# Patient Record
Sex: FEMALE | Race: WHITE | NOT HISPANIC OR LATINO | Employment: FULL TIME | URBAN - METROPOLITAN AREA
[De-identification: names, ages, dates, MRNs, and addresses within clinical notes are randomized per-mention and may not be internally consistent; named-entity substitution may affect disease eponyms.]

---

## 2018-04-07 ENCOUNTER — HOSPITAL ENCOUNTER (OUTPATIENT)
Facility: MEDICAL CENTER | Age: 33
End: 2018-04-07
Attending: OBSTETRICS & GYNECOLOGY | Admitting: OBSTETRICS & GYNECOLOGY
Payer: COMMERCIAL

## 2018-04-08 ENCOUNTER — APPOINTMENT (OUTPATIENT)
Dept: OTHER | Facility: IMAGING CENTER | Age: 33
End: 2018-04-08

## 2018-05-05 ENCOUNTER — APPOINTMENT (OUTPATIENT)
Dept: OTHER | Facility: IMAGING CENTER | Age: 33
End: 2018-05-05

## 2018-06-03 ENCOUNTER — HOSPITAL ENCOUNTER (OUTPATIENT)
Facility: MEDICAL CENTER | Age: 33
End: 2018-06-03
Attending: OBSTETRICS & GYNECOLOGY | Admitting: OBSTETRICS & GYNECOLOGY
Payer: COMMERCIAL

## 2018-06-04 ENCOUNTER — HOSPITAL ENCOUNTER (INPATIENT)
Facility: MEDICAL CENTER | Age: 33
LOS: 2 days | End: 2018-06-06
Attending: OBSTETRICS & GYNECOLOGY | Admitting: OBSTETRICS & GYNECOLOGY
Payer: COMMERCIAL

## 2018-06-04 VITALS
SYSTOLIC BLOOD PRESSURE: 137 MMHG | BODY MASS INDEX: 26.66 KG/M2 | HEART RATE: 75 BPM | WEIGHT: 160 LBS | RESPIRATION RATE: 18 BRPM | TEMPERATURE: 97 F | DIASTOLIC BLOOD PRESSURE: 85 MMHG | HEIGHT: 65 IN

## 2018-06-04 LAB
BASOPHILS # BLD AUTO: 0.2 % (ref 0–1.8)
BASOPHILS # BLD: 0.03 K/UL (ref 0–0.12)
EOSINOPHIL # BLD AUTO: 0 K/UL (ref 0–0.51)
EOSINOPHIL NFR BLD: 0 % (ref 0–6.9)
ERYTHROCYTE [DISTWIDTH] IN BLOOD BY AUTOMATED COUNT: 44 FL (ref 35.9–50)
ERYTHROCYTE [DISTWIDTH] IN BLOOD BY AUTOMATED COUNT: 44.3 FL (ref 35.9–50)
HCT VFR BLD AUTO: 30.1 % (ref 37–47)
HCT VFR BLD AUTO: 38.4 % (ref 37–47)
HGB BLD-MCNC: 10.6 G/DL (ref 12–16)
HGB BLD-MCNC: 13.6 G/DL (ref 12–16)
HOLDING TUBE BB 8507: NORMAL
IMM GRANULOCYTES # BLD AUTO: 0.08 K/UL (ref 0–0.11)
IMM GRANULOCYTES NFR BLD AUTO: 0.5 % (ref 0–0.9)
LYMPHOCYTES # BLD AUTO: 1.12 K/UL (ref 1–4.8)
LYMPHOCYTES NFR BLD: 6.7 % (ref 22–41)
MCH RBC QN AUTO: 33.5 PG (ref 27–33)
MCH RBC QN AUTO: 33.7 PG (ref 27–33)
MCHC RBC AUTO-ENTMCNC: 35.2 G/DL (ref 33.6–35)
MCHC RBC AUTO-ENTMCNC: 35.4 G/DL (ref 33.6–35)
MCV RBC AUTO: 95 FL (ref 81.4–97.8)
MCV RBC AUTO: 95.3 FL (ref 81.4–97.8)
MONOCYTES # BLD AUTO: 0.65 K/UL (ref 0–0.85)
MONOCYTES NFR BLD AUTO: 3.9 % (ref 0–13.4)
NEUTROPHILS # BLD AUTO: 14.88 K/UL (ref 2–7.15)
NEUTROPHILS NFR BLD: 88.7 % (ref 44–72)
NRBC # BLD AUTO: 0 K/UL
NRBC BLD-RTO: 0 /100 WBC
PLATELET # BLD AUTO: 162 K/UL (ref 164–446)
PLATELET # BLD AUTO: 219 K/UL (ref 164–446)
PMV BLD AUTO: 11.4 FL (ref 9–12.9)
PMV BLD AUTO: 11.6 FL (ref 9–12.9)
RBC # BLD AUTO: 3.16 M/UL (ref 4.2–5.4)
RBC # BLD AUTO: 4.04 M/UL (ref 4.2–5.4)
WBC # BLD AUTO: 16.8 K/UL (ref 4.8–10.8)
WBC # BLD AUTO: 16.9 K/UL (ref 4.8–10.8)

## 2018-06-04 PROCEDURE — 770002 HCHG ROOM/CARE - OB PRIVATE (112)

## 2018-06-04 PROCEDURE — 59025 FETAL NON-STRESS TEST: CPT | Performed by: OBSTETRICS & GYNECOLOGY

## 2018-06-04 PROCEDURE — 59409 OBSTETRICAL CARE: CPT

## 2018-06-04 PROCEDURE — 700102 HCHG RX REV CODE 250 W/ 637 OVERRIDE(OP): Performed by: OBSTETRICS & GYNECOLOGY

## 2018-06-04 PROCEDURE — 700112 HCHG RX REV CODE 229: Performed by: OBSTETRICS & GYNECOLOGY

## 2018-06-04 PROCEDURE — 0KQM0ZZ REPAIR PERINEUM MUSCLE, OPEN APPROACH: ICD-10-PCS | Performed by: OBSTETRICS & GYNECOLOGY

## 2018-06-04 PROCEDURE — 700101 HCHG RX REV CODE 250

## 2018-06-04 PROCEDURE — 700105 HCHG RX REV CODE 258: Performed by: OBSTETRICS & GYNECOLOGY

## 2018-06-04 PROCEDURE — 85027 COMPLETE CBC AUTOMATED: CPT

## 2018-06-04 PROCEDURE — 36415 COLL VENOUS BLD VENIPUNCTURE: CPT

## 2018-06-04 PROCEDURE — 700111 HCHG RX REV CODE 636 W/ 250 OVERRIDE (IP): Performed by: OBSTETRICS & GYNECOLOGY

## 2018-06-04 PROCEDURE — A9270 NON-COVERED ITEM OR SERVICE: HCPCS | Performed by: OBSTETRICS & GYNECOLOGY

## 2018-06-04 PROCEDURE — 700111 HCHG RX REV CODE 636 W/ 250 OVERRIDE (IP)

## 2018-06-04 PROCEDURE — 304965 HCHG RECOVERY SERVICES

## 2018-06-04 PROCEDURE — 85025 COMPLETE CBC W/AUTO DIFF WBC: CPT

## 2018-06-04 PROCEDURE — 303615 HCHG EPIDURAL/SPINAL ANESTHESIA FOR LABOR

## 2018-06-04 RX ORDER — IBUPROFEN 600 MG/1
600 TABLET ORAL EVERY 6 HOURS PRN
Status: DISCONTINUED | OUTPATIENT
Start: 2018-06-04 | End: 2018-06-06 | Stop reason: HOSPADM

## 2018-06-04 RX ORDER — OXYCODONE HYDROCHLORIDE AND ACETAMINOPHEN 5; 325 MG/1; MG/1
1 TABLET ORAL EVERY 4 HOURS PRN
Status: DISCONTINUED | OUTPATIENT
Start: 2018-06-04 | End: 2018-06-06 | Stop reason: HOSPADM

## 2018-06-04 RX ORDER — VITAMIN A ACETATE, BETA CAROTENE, ASCORBIC ACID, CHOLECALCIFEROL, .ALPHA.-TOCOPHEROL ACETATE, DL-, THIAMINE MONONITRATE, RIBOFLAVIN, NIACINAMIDE, PYRIDOXINE HYDROCHLORIDE, FOLIC ACID, CYANOCOBALAMIN, CALCIUM CARBONATE, FERROUS FUMARATE, ZINC OXIDE, CUPRIC OXIDE 3080; 12; 120; 400; 1; 1.84; 3; 20; 22; 920; 25; 200; 27; 10; 2 [IU]/1; UG/1; MG/1; [IU]/1; MG/1; MG/1; MG/1; MG/1; MG/1; [IU]/1; MG/1; MG/1; MG/1; MG/1; MG/1
1 TABLET, FILM COATED ORAL EVERY MORNING
Status: DISCONTINUED | OUTPATIENT
Start: 2018-06-04 | End: 2018-06-06 | Stop reason: HOSPADM

## 2018-06-04 RX ORDER — ROPIVACAINE HYDROCHLORIDE 2 MG/ML
INJECTION, SOLUTION EPIDURAL; INFILTRATION; PERINEURAL
Status: COMPLETED
Start: 2018-06-04 | End: 2018-06-04

## 2018-06-04 RX ORDER — OXYCODONE HYDROCHLORIDE AND ACETAMINOPHEN 5; 325 MG/1; MG/1
1 TABLET ORAL EVERY 4 HOURS PRN
Status: CANCELLED | OUTPATIENT
Start: 2018-06-04

## 2018-06-04 RX ORDER — IBUPROFEN 600 MG/1
600 TABLET ORAL EVERY 6 HOURS PRN
Status: CANCELLED | OUTPATIENT
Start: 2018-06-04

## 2018-06-04 RX ORDER — ACETAMINOPHEN 325 MG/1
325 TABLET ORAL EVERY 4 HOURS PRN
Status: DISCONTINUED | OUTPATIENT
Start: 2018-06-04 | End: 2018-06-06 | Stop reason: HOSPADM

## 2018-06-04 RX ORDER — DEXTROSE, SODIUM CHLORIDE, SODIUM LACTATE, POTASSIUM CHLORIDE, AND CALCIUM CHLORIDE 5; .6; .31; .03; .02 G/100ML; G/100ML; G/100ML; G/100ML; G/100ML
INJECTION, SOLUTION INTRAVENOUS CONTINUOUS
Status: DISCONTINUED | OUTPATIENT
Start: 2018-06-04 | End: 2018-06-04 | Stop reason: HOSPADM

## 2018-06-04 RX ORDER — BUPIVACAINE HYDROCHLORIDE 2.5 MG/ML
INJECTION, SOLUTION EPIDURAL; INFILTRATION; INTRACAUDAL
Status: ACTIVE
Start: 2018-06-04 | End: 2018-06-04

## 2018-06-04 RX ORDER — ALUMINA, MAGNESIA, AND SIMETHICONE 2400; 2400; 240 MG/30ML; MG/30ML; MG/30ML
30 SUSPENSION ORAL EVERY 6 HOURS PRN
Status: DISCONTINUED | OUTPATIENT
Start: 2018-06-04 | End: 2018-06-04 | Stop reason: HOSPADM

## 2018-06-04 RX ORDER — SODIUM CHLORIDE, SODIUM LACTATE, POTASSIUM CHLORIDE, CALCIUM CHLORIDE 600; 310; 30; 20 MG/100ML; MG/100ML; MG/100ML; MG/100ML
INJECTION, SOLUTION INTRAVENOUS PRN
Status: DISCONTINUED | OUTPATIENT
Start: 2018-06-04 | End: 2018-06-06 | Stop reason: HOSPADM

## 2018-06-04 RX ORDER — MISOPROSTOL 200 UG/1
600 TABLET ORAL
Status: DISCONTINUED | OUTPATIENT
Start: 2018-06-04 | End: 2018-06-06 | Stop reason: HOSPADM

## 2018-06-04 RX ORDER — MISOPROSTOL 200 UG/1
800 TABLET ORAL
Status: DISCONTINUED | OUTPATIENT
Start: 2018-06-04 | End: 2018-06-04 | Stop reason: HOSPADM

## 2018-06-04 RX ORDER — AMPICILLIN 2 G/1
2 INJECTION, POWDER, FOR SOLUTION INTRAVENOUS ONCE
Status: COMPLETED | OUTPATIENT
Start: 2018-06-04 | End: 2018-06-04

## 2018-06-04 RX ORDER — AMPICILLIN 1 G/1
1 INJECTION, POWDER, FOR SOLUTION INTRAMUSCULAR; INTRAVENOUS EVERY 4 HOURS
Status: DISCONTINUED | OUTPATIENT
Start: 2018-06-04 | End: 2018-06-04

## 2018-06-04 RX ORDER — ONDANSETRON 4 MG/1
4 TABLET, ORALLY DISINTEGRATING ORAL EVERY 6 HOURS PRN
Status: DISCONTINUED | OUTPATIENT
Start: 2018-06-04 | End: 2018-06-06 | Stop reason: HOSPADM

## 2018-06-04 RX ORDER — DOCUSATE SODIUM 100 MG/1
100 CAPSULE, LIQUID FILLED ORAL 2 TIMES DAILY PRN
Status: DISCONTINUED | OUTPATIENT
Start: 2018-06-04 | End: 2018-06-06 | Stop reason: HOSPADM

## 2018-06-04 RX ORDER — OXYCODONE HYDROCHLORIDE AND ACETAMINOPHEN 5; 325 MG/1; MG/1
2 TABLET ORAL EVERY 4 HOURS PRN
Status: DISCONTINUED | OUTPATIENT
Start: 2018-06-04 | End: 2018-06-06 | Stop reason: HOSPADM

## 2018-06-04 RX ORDER — ONDANSETRON 2 MG/ML
4 INJECTION INTRAMUSCULAR; INTRAVENOUS EVERY 6 HOURS PRN
Status: DISCONTINUED | OUTPATIENT
Start: 2018-06-04 | End: 2018-06-06 | Stop reason: HOSPADM

## 2018-06-04 RX ORDER — SODIUM CHLORIDE, SODIUM LACTATE, POTASSIUM CHLORIDE, CALCIUM CHLORIDE 600; 310; 30; 20 MG/100ML; MG/100ML; MG/100ML; MG/100ML
INJECTION, SOLUTION INTRAVENOUS CONTINUOUS
Status: DISPENSED | OUTPATIENT
Start: 2018-06-04 | End: 2018-06-04

## 2018-06-04 RX ADMIN — DOCUSATE SODIUM 100 MG: 100 CAPSULE ORAL at 21:00

## 2018-06-04 RX ADMIN — SODIUM CHLORIDE, POTASSIUM CHLORIDE, SODIUM LACTATE AND CALCIUM CHLORIDE: 600; 310; 30; 20 INJECTION, SOLUTION INTRAVENOUS at 05:55

## 2018-06-04 RX ADMIN — AMPICILLIN SODIUM 1 G: 1 INJECTION, POWDER, FOR SOLUTION INTRAMUSCULAR; INTRAVENOUS at 10:14

## 2018-06-04 RX ADMIN — OXYTOCIN 125 ML/HR: 10 INJECTION, SOLUTION INTRAMUSCULAR; INTRAVENOUS at 15:01

## 2018-06-04 RX ADMIN — AMPICILLIN SODIUM 2 G: 2 INJECTION, POWDER, FOR SOLUTION INTRAMUSCULAR; INTRAVENOUS at 05:30

## 2018-06-04 RX ADMIN — ROPIVACAINE HYDROCHLORIDE 100 ML: 2 INJECTION, SOLUTION EPIDURAL; INFILTRATION at 05:49

## 2018-06-04 RX ADMIN — OXYCODONE HYDROCHLORIDE AND ACETAMINOPHEN 1 TABLET: 5; 325 TABLET ORAL at 21:00

## 2018-06-04 RX ADMIN — IBUPROFEN 600 MG: 600 TABLET, FILM COATED ORAL at 17:06

## 2018-06-04 RX ADMIN — SODIUM CHLORIDE, POTASSIUM CHLORIDE, SODIUM LACTATE AND CALCIUM CHLORIDE: 600; 310; 30; 20 INJECTION, SOLUTION INTRAVENOUS at 05:05

## 2018-06-04 ASSESSMENT — COPD QUESTIONNAIRES
IN THE PAST 12 MONTHS DO YOU DO LESS THAN YOU USED TO BECAUSE OF YOUR BREATHING PROBLEMS: DISAGREE/UNSURE
COPD SCREENING SCORE: 0
HAVE YOU SMOKED AT LEAST 100 CIGARETTES IN YOUR ENTIRE LIFE: NO/DON'T KNOW
DO YOU EVER COUGH UP ANY MUCUS OR PHLEGM?: NO/ONLY WITH OCCASIONAL COLDS OR INFECTIONS
DURING THE PAST 4 WEEKS HOW MUCH DID YOU FEEL SHORT OF BREATH: NONE/LITTLE OF THE TIME

## 2018-06-04 ASSESSMENT — PATIENT HEALTH QUESTIONNAIRE - PHQ9
1. LITTLE INTEREST OR PLEASURE IN DOING THINGS: NOT AT ALL
SUM OF ALL RESPONSES TO PHQ9 QUESTIONS 1 AND 2: 0
SUM OF ALL RESPONSES TO PHQ9 QUESTIONS 1 AND 2: 0
2. FEELING DOWN, DEPRESSED, IRRITABLE, OR HOPELESS: NOT AT ALL
2. FEELING DOWN, DEPRESSED, IRRITABLE, OR HOPELESS: NOT AT ALL
1. LITTLE INTEREST OR PLEASURE IN DOING THINGS: NOT AT ALL

## 2018-06-04 ASSESSMENT — PAIN SCALES - GENERAL
PAINLEVEL_OUTOF10: 6
PAINLEVEL_OUTOF10: 6

## 2018-06-04 ASSESSMENT — LIFESTYLE VARIABLES
EVER_SMOKED: NEVER
ALCOHOL_USE: NO

## 2018-06-04 NOTE — PROGRESS NOTES
0700: Assumed care of pt. AAO, pt has an epidural in place, denies pain. POC discussed, pt verbalized understanding. All needs met at this time.

## 2018-06-04 NOTE — PROGRESS NOTES
Dr. Ye to bedside for evaluation, SROM of meconium stained fluid at 0800. SVE 6/100/-1. Pt educated regarding meconium and what to expect at delivery

## 2018-06-04 NOTE — PROGRESS NOTES
EDC - 18 EGA - 40.2    0442 - Pt arrived to labor and delivery for Contractions. Pt placed in room 213. External monitors in place X2.  VSS. Pt reports good FM. No complaints of ROM or vaginal bleeding. Pt was sent home earlier in the night at 1/100/-2  0445 SVE 4/100/-2. FOB at bedside. POC discussed with pt and family members, all questions answered.  0450 Dr. Lezama notified at this time, orders received for admission at this time.  0505 IV started with 2 attempts, Labs drawn. Admission procedures completed at this time, pt requesting epidural, bolus started.  0540 - Dr. Vilchis at bedside. Time out called at 0540. Epidural placed at this time by Dr Vilchis. Test dose at 0547 - No reaction detected - VSS. Dermatome level of T8. Epidural infusion settings are : 10mL/hr continuous infusion, 5mL bolus every 15 minutes with a 25mL/hr dose limit.   0600 Dr. Lezama at bedside. POC discussed.  0615 - Reyes placed and draining to gravity. SVE 5-6/100/-2. Turned to right side. Category 1 FHT tracing at this time. No complaints at this time.   0700 Report given to OSCAR Ya RN.

## 2018-06-04 NOTE — PROGRESS NOTES
Pt is a ; MIKE of 6/; making her 40w1d. Pt here c/o painful UCs since 1600 this afternoon. Pt denies LOF< VB and reports +FM. EFM and TOCO applied. VSS (See flowsheet). SVE at 1/100/-2.     SVE one hour later with no cervical change noted. Dr Lezama updated on pt status. Orders received to discharge pt home with labor precautions.     General discharge instructions and term labor precautions discussed with pt and FOB. All questions answered at this time. Pt signed discharge instructions and ambulated out in stable condition.

## 2018-06-04 NOTE — CARE PLAN
Problem: Infection  Goal: Will remain free from infection    Intervention: Assess signs and symptoms of infection  Pt is free from s/s of infection  Intervention: Implement standard precautions and perform hand washing before and after patient contact  Hand hygiene performed before and after pt contact      Problem: Pain  Goal: Alleviation of Pain or a reduction in pain to the patient's comfort goal  Outcome: PROGRESSING AS EXPECTED  Pt is free from pain with an epidural in place  Intervention: Pain Management - Epidural/Spinal  Pt is comfortable with an epidural  Intervention: Pain Management--Medications  Pt educated on medications available for pain

## 2018-06-04 NOTE — H&P
IDENTIFICATION:  This is a 32-year-old  1, para 0, EDC of 2018,   which makes her 40 weeks and 2 days, who is being admitted in active labor.    HISTORY OF PRESENT ILLNESS:  Patient has been followed by Dr. Ye.  This is   an IVF pregnancy.  Mom has a history of PCOS.  She has been on metformin   throughout her pregnancy.  She also saw Dr. Meza and had a normal first   trimester screen with a negative AFP and a normal ultrasound.  Her GBS was   positive.  She presented earlier this evening with 1 cm and sent home.  She   returned, she is now 4, 100% effaced and therefore is being admitted.  She is   requesting an epidural at this time.    PAST MEDICAL HISTORY:  She has PCOS.    PAST SURGICAL HISTORY:  None.    ALLERGIES:  She has no known drug allergies.    SOCIAL HISTORY:  She is .  She is here with her .  She denies   use of tobacco, alcohol or drugs.    PHYSICAL EXAMINATION:  VITAL SIGNS:  Blood pressure 127/80, pulse is 91.  GENERAL:  She is pleasant, cooperative and appears her stated age.  HEART:  Regular rate and rhythm.  LUNGS:  Clear to auscultation bilaterally.  ABDOMEN:  Soft, gravid, Leopold's approximately 8 pounds.  GENITOURINARY:  Sterile vaginal exam, 4, 100%, -2 station and still intact.    Fetal heart tracing is category 1.  Contractions are every 3-4 minutes.    ASSESSMENT:  A 32-year-old  1, para 0 female at 40 weeks and 2 days,   here in labor, with Group B streptococcus positive status and history of   polycystic ovary syndrome.    PLAN:  To admit, obtain her epidural, start GBS prophylaxis, and we will   follow her in labor.       ____________________________________     MD JESSICA Paez / RODRÍGUEZ    DD:  2018 05:53:11  DT:  2018 06:03:22    D#:  [id number]  Job#:  [id number]

## 2018-06-04 NOTE — DELIVERY NOTE
DATE OF SERVICE:  2018    This is a 32-year-old  1, para 0 with an EGA of 40 and 2/7, who   presents complaining of uterine contractions.  Her cervix was changed to 4 cm,   subsequently admitted and received an epidural for pain control.  She   spontaneously ruptured membranes with moderate meconium after receiving a dose   of IV antibiotics for beta strep-positive status.  She received a second dose   of IV antibiotics, progressed over normal labor curve to complete with an   overall reassuring fetal heart tracing.  At complete, she was able to push   baby down to a +3 station to deliver the head atraumatically.  There was no   nuchal cord.  The shoulders and body followed without complication.  The cord   was clamped and cut.  Cord gases were collected and sent.  The infant was   handed off to awaiting respiratory team for DeLee.  At this point, the   placenta was delivered intact 3-vessel cord.  The uterus firmed up nicely   after 20 units of Pitocin.  Cervix was intact.  There was a small   second-degree midline laceration which was repaired with 3-0 Monocryl running   locking above the hymenal ring, running below the hymenal ring, was   subcuticular stitch back up to the hymenal ring.  Estimated blood loss was 300   mL.  The patient and baby to recovery room in stable condition.    FINDINGS:  Include a female infant with Apgars of 8 and 9.  There were no   complications.       ____________________________________     MD FAIZAN Sanchez / RODRÍGUEZ    DD:  2018 13:36:30  DT:  2018 13:59:01    D#:  [id number]  Job#:  [id number]

## 2018-06-04 NOTE — CARE PLAN
Problem: Risk for Infection, Impaired Wound Healing  Goal: Remain free from signs and symptoms of infection  Outcome: PROGRESSING AS EXPECTED  No s/s of infection noted at this time.    Problem: Risk for injury  Goal: Patient and fetus will be free of preventable injury/complications    Intervention: Monitor Fetal well being  FHTs being monitored continuously

## 2018-06-04 NOTE — PROGRESS NOTES
Hospital Day : 0    S: doing well; comfortable with epi    O:  Vitals:    06/04/18 0643 06/04/18 0656 06/04/18 0702 06/04/18 0730   BP: 108/70 107/64 119/67 108/66   Pulse: 97 (!) 105 100 (!) 115   Resp:       Temp:  36.4 °C (97.6 °F)     TempSrc:  Temporal     SpO2:       Weight:       Height:           Recent Labs      06/04/18   0505   WBC  16.8*   RBC  4.04*   HEMOGLOBIN  13.6   HEMATOCRIT  38.4   MCV  95.0   MCH  33.7*   MCHC  35.4*   RDW  44.3   PLATELETCT  219   MPV  11.6             Cat one; reg uc; srom lite mec; sp abx x1    A: iup at term with gbs pos and srom lite mec with active labor    P: cpm and abx and dw mec

## 2018-06-04 NOTE — PROGRESS NOTES
Pt ambulated, unable to void, pt and baby transferred to postpartum via wheelchair. Report given to Bess ESQUEDA, pt in stable condition with a firm fundus and light lochia.

## 2018-06-04 NOTE — RESPIRATORY CARE
Attendance at Delivery    Reason for attendance Meconium   Oxygen Needed 40% blowby for 2-3 minutes   Positive Pressure Needed no   Baby Vigorous yes   Evidence of Meconium Moderate amount of mec  Apgar's 8-9 Baby pink and crying before leaving the room.

## 2018-06-05 PROCEDURE — 700102 HCHG RX REV CODE 250 W/ 637 OVERRIDE(OP): Performed by: OBSTETRICS & GYNECOLOGY

## 2018-06-05 PROCEDURE — 770002 HCHG ROOM/CARE - OB PRIVATE (112)

## 2018-06-05 PROCEDURE — A9270 NON-COVERED ITEM OR SERVICE: HCPCS | Performed by: OBSTETRICS & GYNECOLOGY

## 2018-06-05 PROCEDURE — 700112 HCHG RX REV CODE 229: Performed by: OBSTETRICS & GYNECOLOGY

## 2018-06-05 RX ADMIN — IBUPROFEN 600 MG: 600 TABLET, FILM COATED ORAL at 04:39

## 2018-06-05 RX ADMIN — Medication 1 TABLET: at 08:44

## 2018-06-05 RX ADMIN — IBUPROFEN 600 MG: 600 TABLET, FILM COATED ORAL at 11:35

## 2018-06-05 RX ADMIN — DOCUSATE SODIUM 100 MG: 100 CAPSULE ORAL at 08:44

## 2018-06-05 RX ADMIN — DOCUSATE SODIUM 100 MG: 100 CAPSULE ORAL at 23:04

## 2018-06-05 RX ADMIN — IBUPROFEN 600 MG: 600 TABLET, FILM COATED ORAL at 23:03

## 2018-06-05 ASSESSMENT — PAIN SCALES - GENERAL
PAINLEVEL_OUTOF10: 5
PAINLEVEL_OUTOF10: 5
PAINLEVEL_OUTOF10: 3
PAINLEVEL_OUTOF10: 0
PAINLEVEL_OUTOF10: 6
PAINLEVEL_OUTOF10: 5
PAINLEVEL_OUTOF10: 3
PAINLEVEL_OUTOF10: 4
PAINLEVEL_OUTOF10: 6

## 2018-06-05 NOTE — PROGRESS NOTES
Hospital Day : 1    S: doing well; billy diet; min bleed    O:  Vitals:    18 1715 18 2000 18 0000 18 0800   BP: 107/72 101/72 101/62 103/71   Pulse: 92 89 96 (!) 106   Resp:    Temp: 37.1 °C (98.8 °F) 36.8 °C (98.2 °F) 36.4 °C (97.6 °F) 36.4 °C (97.5 °F)   TempSrc:       SpO2: 96% 96% 96% 95%   Weight:       Height:           Recent Labs      18   0505  18   2221   WBC  16.8*  16.9*   RBC  4.04*  3.16*   HEMOGLOBIN  13.6  10.6*   HEMATOCRIT  38.4  30.1*   MCV  95.0  95.3   MCH  33.7*  33.5*   MCHC  35.4*  35.2*   RDW  44.3  44.0   PLATELETCT  219  162*   MPV  11.6  11.4             abd soft ff    A: pp 1 sp ; gbs pos post abx; doing well    P: cpm with am dc

## 2018-06-05 NOTE — CARE PLAN
Problem: Communication  Goal: The ability to communicate needs accurately and effectively will improve  Outcome: PROGRESSING AS EXPECTED  Patient has been seen by lactation for breast-feeding help. Patient has been ambulating to bathroom, taking adequate PO fluids and voiding. All questions and concerns answered.     Problem: Pain Management  Goal: Pain level will decrease to patient's comfort goal  Outcome: PROGRESSING AS EXPECTED  Patient likes to call for PRN pain medication when needed.

## 2018-06-05 NOTE — PROGRESS NOTES
0662- Bedside report received from DHEERAJ Cm.  Patient denied needs.  1015- Patient assessment done.  Patient stated that she is voiding without difficulty and passing flatus.  Patient denied dizziness and stated that she is walking without difficulty.  Discussed pain management plan and patient prefers to call for pain intervention as needed.  Reviewed plan of care.  FOB at bedside.  Patient ambulated in hallways with steady gait.  1300- Report given to DHEERAJ Bee, who assumed care of patient.

## 2018-06-05 NOTE — PROGRESS NOTES
This is mother's first baby. Mother reports baby has been breastfeeding well. Mother has baby on left breast independently using football hold, observed deep latch, mother denies pain with latch. Mother appears comfortable with breastfeeding. Mother has Home [facility] and plans to follow-up with [facility] for outpatient services, NB booklet given earlier. Reinforced breastfeeding plan from previous LC.     Teaching on hunger cues, breastfeeding when baby shows cues or by 3 hours from last feed, importance of skin to skin, positioning baby at breast & cluster feeding.     Breastfeeding POC:  Breastfeed on demand or by 3 hours from last feed, lots of skin to skin.

## 2018-06-05 NOTE — PROGRESS NOTES
Pt arrived on unit from L&D with infant in arms.  Armbands and cuddles verified. Pt medicated for pain on arrival. Oriented pt and  to unit and surroundings.  Assessment completed.

## 2018-06-05 NOTE — CONSULTS
Lactation note:     Per RN request to see couplet. Initial visit.  Discussed normal  behaviors and normal course of breastfeeding at 12-24-48-72 hours, and what to expect. Discussed importance of offering breast every 2-3 hours, and even if infant shows no interest, can do hand expression into infant's lips. Encouraged to continue doing skin to skin. Discussed signs of a good latch, voiding and stooling patterns, feeding cues, stomach size, and importance of establishing milk supply with frequency of feedings. No feeding cues noted.   New Beginnings pamphlet given, and breastfeeding content reviewed.   Plan for tonight is to continue to offer breast first, if not latching well, can hand express colostrum, and refeed by spoon.       KEVAN has [facility], and is aware to get her insurance issued pump from [facility]. Also discussed her lactation benefits available to her as outpatient.     KEVAN has no other questions or concerns regarding breastfeeding. Encouraged to call for assistance if needed with latch.

## 2018-06-06 VITALS
HEIGHT: 65 IN | SYSTOLIC BLOOD PRESSURE: 110 MMHG | TEMPERATURE: 97.6 F | HEART RATE: 89 BPM | RESPIRATION RATE: 19 BRPM | WEIGHT: 160 LBS | DIASTOLIC BLOOD PRESSURE: 69 MMHG | BODY MASS INDEX: 26.66 KG/M2 | OXYGEN SATURATION: 95 %

## 2018-06-06 PROCEDURE — A9270 NON-COVERED ITEM OR SERVICE: HCPCS | Performed by: OBSTETRICS & GYNECOLOGY

## 2018-06-06 PROCEDURE — 700102 HCHG RX REV CODE 250 W/ 637 OVERRIDE(OP): Performed by: OBSTETRICS & GYNECOLOGY

## 2018-06-06 RX ORDER — IBUPROFEN 600 MG/1
600 TABLET ORAL EVERY 6 HOURS PRN
Qty: 30 TAB | Refills: 1 | Status: ON HOLD | OUTPATIENT
Start: 2018-06-06 | End: 2022-01-31

## 2018-06-06 RX ADMIN — IBUPROFEN 600 MG: 600 TABLET, FILM COATED ORAL at 06:22

## 2018-06-06 ASSESSMENT — PAIN SCALES - GENERAL
PAINLEVEL_OUTOF10: 0
PAINLEVEL_OUTOF10: 4

## 2018-06-06 NOTE — CARE PLAN
Problem: Altered physiologic condition related to immediate post-delivery state and potential for bleeding/hemorrhage  Goal: Patient physiologically stable as evidenced by normal lochia, palpable uterine involution and vital signs within normal limits  Outcome: PROGRESSING AS EXPECTED  Fundus firm at umbilicus with scant lochia.    Problem: Alteration in comfort related to episiotomy, vaginal repair and/or after birth pains  Goal: Patient is able to ambulate, care for self and infant  Outcome: PROGRESSING AS EXPECTED  Ambulating  and voiding without difficulty.  Goal: Patient verbalizes acceptable pain level  Outcome: PROGRESSING AS EXPECTED  Verbalizes acceptable pain relief with pain medication being given as requested.    Problem: Potential knowledge deficit related to lack of understanding of self and  care  Goal: Patient will demonstrate ability to care for self and infant  Outcome: PROGRESSING AS EXPECTED  Breast feeding well independently.    Problem: Potential anxiety related to difficulty adapting to parental role  Goal: Patient will verbalize and demonstrate effective bonding and parenting behavior  Outcome: PROGRESSING AS EXPECTED  Bonding well with infant by breast feeding and doing skin to skin frequently.

## 2018-06-06 NOTE — DISCHARGE INSTRUCTIONS
POSTPARTUM DISCHARGE INSTRUCTIONS FOR MOM    YOB: 1985   Age: 32 y.o.               Admit Date: 2018     Discharge Date: 2018  Attending Doctor:  Andrea Ye M.D.                  Allergies:  Patient has no known allergies.    Discharged to home by car. Discharged via wheelchair, hospital escort: Yes.  Special equipment needed: Not Applicable  Belongings with: Personal  Be sure to schedule a follow-up appointment with your primary care doctor or any specialists as instructed.     Discharge Plan:   Diet Plan: Discussed  Activity Level: Discussed  Confirmed Follow up Appointment: Patient to Call and Schedule Appointment  Confirmed Symptoms Management: Discussed  Medication Reconciliation Updated: Yes    REASONS TO CALL YOUR OBSTETRICIAN:  1.   Persistent fever or shaking chills (Temperature higher than 100.4)  2.   Heavy bleeding (soaking more than 1 pad per hour); Passing clots  3.   Foul odor from vagina  4.   Mastitis (Breast infection; breast pain, chills, fever, redness)  5.   Urinary pain, burning or frequency  6.   Episiotomy infection  7.   Abdominal incision infection  8.   Severe depression longer than 24 hours    HAND WASHING  · Prior to handling the baby.  · Before breastfeeding or bottle feeding baby.  · After using the bathroom or changing the baby's diaper.    WOUND CARE  Ask your physician for additional care instructions.  In general:    ·  Incision:      · Keep clean and dry.    · Do NOT lift anything heavier than your baby for up to 6 weeks.    · There should not be any opening or pus.      VAGINAL CARE  · Nothing inside vagina for 6 weeks: no sexual intercourse, tampons or douching.  · Bleeding may continue for 2-4 weeks.  Amount may vary.    · Call your physician for heavy bleeding which means soaking more than 1 pad per hour    BIRTH CONTROL  · It is possible to become pregnant at any time after delivery and while breastfeeding.  · Plan to discuss a method of birth  "control with your physician at your follow up visit. visit.    DIET AND ELIMINATION  · Eating more fiber (bran cereal, fruits, and vegetables) and drinking plenty of fluids will help to avoid constipation.  · Urinary frequency after childbirth is normal.    POSTPARTUM BLUES  During the first few days after birth, you may experience a sense of the \"blues\" which may include impatience, irritability or even crying.  These feeling come and go quickly.  However, as many as 1 in 10 women experience emotional symptoms known as postpartum depression.    Postpartum depression:  May start as early as the second or third day after delivery or take several weeks or months to develop.  Symptoms of \"blues\" are present, but are more intense:  Crying spells; loss of appetite; feelings of hopelessness or loss of control; fear of touching the baby; over concern or no concern at all about the baby; little or no concern about your own appearance/caring for yourself; and/or inability to sleep or excessive sleeping.  Contact your physician if you are experiencing any of these symptoms.    Crisis Hotline:  · [place] Crisis Hotline:  5-418-SJNVBIF  Or 1-369.664.5734  · [place] Crisis Hotline:  1-687.139.9143  Or 609-594-6548    PREVENTING SHAKEN BABY:  If you are angry or stressed, PUT THE BABY IN THE CRIB, step away, take some deep breaths, and wait until you are calm to care for the baby.  DO NOT SHAKE THE BABY.  You are not alone, call a supporter for help.    · Crisis Call Center 24/7 crisis line 027-969-9564 or 1-351.511.6351  · You can also text them, text \"ANSWER\" to 662028    QUIT SMOKING/TOBACCO USE:  I understand the use of any tobacco products increases my chance of suffering from future heart disease and could cause other illnesses which may shorten my life. Quitting the use of tobacco products is the single most important thing I can do to improve my health. For further information on smoking / tobacco cessation call a Toll " Free Quit Line at 1-115.586.6095 (*National Cancer [place]) or 1-999.385.2041 (American Lung Association) or you can access the web based program at www.lungusa.org.    · [place] Tobacco Users Help Line:  (619) 386-9779       Toll Free: 1-296.768.4418  · Quit Tobacco Program [facility] (870)929-5598    DEPRESSION / SUICIDE RISK:  As [facility] are [facility], it is important to learn how to keep safe from harming yourself.    Recognize the warning signs:  · Abrupt changes in personality, positive or negative- including increase in energy   · Giving away possessions  · Change in eating patterns- significant weight changes-  positive or negative  · Change in sleeping patterns- unable to sleep or sleeping all the time   · Unwillingness or inability to communicate  · Depression  · Unusual sadness, discouragement and loneliness  · Talk of wanting to die  · Neglect of personal appearance   · Rebelliousness- reckless behavior  · Withdrawal from people/activities they love  · Confusion- inability to concentrate     If you or a loved one observes any of these behaviors or has concerns about self-harm, here's what you can do:  · Talk about it- your feelings and reasons for harming yourself  · Remove any means that you might use to hurt yourself (examples: pills, rope, extension cords, firearm)  · Get professional help from the community (Mental Health, Substance Abuse, psychological counseling)  · Do not be alone:Call your Safe Contact- someone whom you trust who will be there for you.  · Call your local CRISIS HOTLINE 158-7788 or 844-858-1205  · Call your local Children's Mobile Crisis Response Team [place] (702) 054-9461 or www.[employer]  · Call the toll free National Suicide Prevention Hotlines   · National Suicide Prevention Lifeline 765-745-STGZ (3680)  · National Hope Line Network 800-SUICIDE (040-2084)    DISCHARGE SURVEY:  Thank you for choosing [facility].  We hope we provided you with very good care.  You may be receiving a survey in the mail.  Please fill it out.  Your opinion is valuable to us.    ADDITIONAL EDUCATIONAL MATERIALS GIVEN TO PATIENT:        My signature on this form indicates that:  1.  I have reviewed and understand the above information  2.  My questions regarding this information have been answered to my satisfaction.  3.  I have formulated a plan with my discharge nurse to obtain my prescribed medication for home.

## 2018-06-06 NOTE — PROGRESS NOTES
Hospital Day : 2    S: doing well; billy diet; min bleed    O:  Vitals:    18 0000 18 0800 18 2000 18 0800   BP: 101/62 103/71 106/70 110/69   Pulse: 96 (!) 106 98 89   Resp:    Temp: 36.4 °C (97.6 °F) 36.4 °C (97.5 °F) 36.6 °C (97.8 °F) 36.4 °C (97.6 °F)   TempSrc:       SpO2: 96% 95% 96% 95%   Weight:       Height:           Recent Labs      18   0505  18   2221   WBC  16.8*  16.9*   RBC  4.04*  3.16*   HEMOGLOBIN  13.6  10.6*   HEMATOCRIT  38.4  30.1*   MCV  95.0  95.3   MCH  33.7*  33.5*   MCHC  35.4*  35.2*   RDW  44.3  44.0   PLATELETCT  219  162*   MPV  11.6  11.4             abd soft ff    A: pp 2 sp ; gbs pos; post abx    P: dc

## 2018-06-06 NOTE — PROGRESS NOTES
Reviewed signs of deep latch and milk transfer, reviewed expected timeline for onset of lactogenesis stage 2. All questions answered, plans to follow-up at [facility] for ongoing outpatient care. Parents deny further questions at this time.

## 2018-06-07 NOTE — DISCHARGE SUMMARY
DATE OF ADMISSION:  06/04/2018    DATE OF DISCHARGE:  06/06/2018    ADMITTING DIAGNOSES:  1.  Pregnancy at 40 and 2/7 weeks.  2.  Labor.  3.  Beta strep positive.    DISCHARGE DIAGNOSES:  1.  Pregnancy at 40 and 2/7 weeks.  2.  Labor.  3.  Beta strep positive.  4.  Status post IV antibiotics.  5.  Status post normal spontaneous vaginal delivery.    HOSPITAL COURSE IN DETAIL:  This patient was admitted on the aforementioned   date in labor, known beta strep positive.  She was started on IV antibiotics,   progressed over normal labor curve to complete with an epidural for pain   control and eventually delivering a female infant with Apgars of 8 and 9.    Patient and baby recovered in stable condition.  On postpartum day #1, she is   doing well without complaint, tolerating regular diet.  Her H and H is stable   at 10.6 and 30.1.  Today, postpartum day #2, she desires discharge home.  She   is afebrile.  Her vitals are within normal limits.  Her abdomen is soft with   full fundus below the umbilicus.    ASSESSMENT:  At this time is post-partum day #2, status post normal   spontaneous vaginal delivery, doing well, desires discharge home.    PLAN:  At this time:  1.  Discharge home.  2.  Follow up in 6 weeks.  3.  Pelvic rest.  4.  Lifting precautions.  5.  Scripts for Motrin written.       ____________________________________     MD FAIZAN Sanchez / RODRÍGUEZ    DD:  06/06/2018 12:49:55  DT:  06/07/2018 01:01:08    D#:  [id number]  Job#:  [id number]

## 2019-04-17 ENCOUNTER — OFFICE VISIT (OUTPATIENT)
Dept: URGENT CARE | Facility: CLINIC | Age: 34
End: 2019-04-17
Payer: COMMERCIAL

## 2019-04-17 VITALS
HEIGHT: 65 IN | TEMPERATURE: 98.7 F | HEART RATE: 80 BPM | DIASTOLIC BLOOD PRESSURE: 84 MMHG | OXYGEN SATURATION: 97 % | BODY MASS INDEX: 26.66 KG/M2 | SYSTOLIC BLOOD PRESSURE: 116 MMHG | RESPIRATION RATE: 16 BRPM | WEIGHT: 160 LBS

## 2019-04-17 DIAGNOSIS — J06.9 URI WITH COUGH AND CONGESTION: Primary | ICD-10-CM

## 2019-04-17 PROCEDURE — 99213 OFFICE O/P EST LOW 20 MIN: CPT | Performed by: NURSE PRACTITIONER

## 2019-04-17 ASSESSMENT — ENCOUNTER SYMPTOMS
SINUS PAIN: 0
CARDIOVASCULAR NEGATIVE: 1
SHORTNESS OF BREATH: 0
SPUTUM PRODUCTION: 1
SORE THROAT: 1
COUGH: 1
FEVER: 0

## 2019-04-17 NOTE — PROGRESS NOTES
Subjective:     Demetra Lomeli is a 33 y.o. female who presents for Pharyngitis (X1 week/ X1 week productive cough)       Pharyngitis    This is a new problem. Episode onset: 1 week ago. The problem has been unchanged. Associated symptoms include congestion and coughing. Pertinent negatives include no ear pain or shortness of breath. She has tried acetaminophen for the symptoms.   Patient states that her  was the first to develop similar symptoms.His cough is now improving but continues to have a sore throat. Patient's daughter also had a cough for about 3 days but she is doing much better now. Patient currently breast-feeding.     PMH:  has a past medical history of PCOS (polycystic ovarian syndrome). She also has no past medical history of Asthma; Diabetes ([facility]); Headache(784.0); Seizure ([facility]); or Thyroid disease.    MEDS:   Current Outpatient Prescriptions:   •  metFORMIN (GLUCOPHAGE) 500 MG Tab, , Disp: , Rfl: 0  •  Prenatal Vit-Fe Fumarate-FA (PRENATAL 1+1 PO), Take  by mouth., Disp: , Rfl:   •  ibuprofen (MOTRIN) 600 MG Tab, Take 1 Tab by mouth every 6 hours as needed (For cramping after delivery; do not give if patient is receiving ketorolac (Toradol))., Disp: 30 Tab, Rfl: 1    ALLERGIES: No Known Allergies    SURGHX: History reviewed. No pertinent surgical history.    SOCHX:  reports that she has never smoked. She has never used smokeless tobacco. She reports that she drinks alcohol. She reports that she does not use drugs.     FH: Reviewed with patient, not pertinent to this visit.     Review of Systems   Constitutional: Positive for malaise/fatigue. Negative for fever.   HENT: Positive for congestion and sore throat. Negative for ear pain and sinus pain.    Respiratory: Positive for cough and sputum production. Negative for shortness of breath.    Cardiovascular: Negative.    All other systems reviewed and are negative.    Objective:     /84 (BP Location: Left arm, Patient Position:  "Sitting, BP Cuff Size: Adult)   Pulse 80   Temp 37.1 °C (98.7 °F) (Temporal)   Resp 16   Ht 1.651 m (5' 5\")   Wt 72.6 kg (160 lb)   SpO2 97%   BMI 26.63 kg/m²     Physical Exam   Constitutional: She is oriented to person, place, and time. She appears well-developed and well-nourished. She is cooperative.  Non-toxic appearance. No distress.   HENT:   Head: Normocephalic and atraumatic.   Right Ear: Tympanic membrane and external ear normal.   Left Ear: Tympanic membrane and external ear normal.   Nose: Mucosal edema and rhinorrhea present. Right sinus exhibits no maxillary sinus tenderness and no frontal sinus tenderness. Left sinus exhibits no maxillary sinus tenderness and no frontal sinus tenderness.   Mouth/Throat: Uvula is midline, oropharynx is clear and moist and mucous membranes are normal. No oropharyngeal exudate, posterior oropharyngeal edema or posterior oropharyngeal erythema.   Postnasal drip   Eyes: Pupils are equal, round, and reactive to light. Conjunctivae and EOM are normal.   Neck: Normal range of motion.   Cardiovascular: Normal rate, regular rhythm, normal heart sounds and normal pulses.    Pulmonary/Chest: Effort normal and breath sounds normal. No respiratory distress. She has no decreased breath sounds.   Abdominal: Bowel sounds are normal.   Musculoskeletal: Normal range of motion. She exhibits no deformity.   Lymphadenopathy:     She has no cervical adenopathy.   Neurological: She is alert and oriented to person, place, and time. She has normal strength. No sensory deficit.   Skin: Skin is warm, dry and intact. Capillary refill takes less than 2 seconds.   Psychiatric: She has a normal mood and affect. Her behavior is normal.   Vitals reviewed.       Assessment/Plan:     1. URI with cough and congestion    Discussed likely viral etiology and expected course and duration of illness.  Recommended initiation of nasal rinses with Valeria pot and Flonase nasal spray for symptomatic relief. " Discussed close monitoring and continuing supportive measures including increasing fluids and rest as well as OTC symptom management including acetaminophen PRN pain and/or fever.     Patient advised to: Return for 1) Symptoms don't improve or worsen, or go to ER, 2) Follow up with primary care in 7-10 days.    Differential diagnosis, natural history, supportive care, and indications for immediate follow-up discussed. All questions answered. Patient agrees with the plan of care.

## 2019-04-17 NOTE — PATIENT INSTRUCTIONS
"Upper Respiratory Infection, Adult  Most upper respiratory infections (URIs) are a viral infection of the air passages leading to the lungs. A URI affects the nose, throat, and upper air passages. The most common type of URI is nasopharyngitis and is typically referred to as \"the common cold.\"  URIs run their course and usually go away on their own. Most of the time, a URI does not require medical attention, but sometimes a bacterial infection in the upper airways can follow a viral infection. This is called a secondary infection. Sinus and middle ear infections are common types of secondary upper respiratory infections.  Bacterial pneumonia can also complicate a URI. A URI can worsen asthma and chronic obstructive pulmonary disease (COPD). Sometimes, these complications can require emergency medical care and may be life threatening.  What are the causes?  Almost all URIs are caused by viruses. A virus is a type of germ and can spread from one person to another.  What increases the risk?  You may be at risk for a URI if:  · You smoke.  · You have chronic heart or lung disease.  · You have a weakened defense (immune) system.  · You are very young or very old.  · You have nasal allergies or asthma.  · You work in crowded or poorly ventilated areas.  · You work in health care facilities or schools.  What are the signs or symptoms?  Symptoms typically develop 2-3 days after you come in contact with a cold virus. Most viral URIs last 7-10 days. However, viral URIs from the influenza virus (flu virus) can last 14-18 days and are typically more severe. Symptoms may include:  · Runny or stuffy (congested) nose.  · Sneezing.  · Cough.  · Sore throat.  · Headache.  · Fatigue.  · Fever.  · Loss of appetite.  · Pain in your forehead, behind your eyes, and over your cheekbones (sinus pain).  · Muscle aches.  How is this diagnosed?  Your health care provider may diagnose a URI by:  · Physical exam.  · Tests to check that your " symptoms are not due to another condition such as:  ¨ Strep throat.  ¨ Sinusitis.  ¨ Pneumonia.  ¨ Asthma.  How is this treated?  A URI goes away on its own with time. It cannot be cured with medicines, but medicines may be prescribed or recommended to relieve symptoms. Medicines may help:  · Reduce your fever.  · Reduce your cough.  · Relieve nasal congestion.  Follow these instructions at home:  · Take medicines only as directed by your health care provider.  · Gargle warm saltwater or take cough drops to comfort your throat as directed by your health care provider.  · Use a warm mist humidifier or inhale steam from a shower to increase air moisture. This may make it easier to breathe.  · Drink enough fluid to keep your urine clear or pale yellow.  · Eat soups and other clear broths and maintain good nutrition.  · Rest as needed.  · Return to work when your temperature has returned to normal or as your health care provider advises. You may need to stay home longer to avoid infecting others. You can also use a face mask and careful hand washing to prevent spread of the virus.  · Increase the usage of your inhaler if you have asthma.  · Do not use any tobacco products, including cigarettes, chewing tobacco, or electronic cigarettes. If you need help quitting, ask your health care provider.  How is this prevented?  The best way to protect yourself from getting a cold is to practice good hygiene.  · Avoid oral or hand contact with people with cold symptoms.  · Wash your hands often if contact occurs.  There is no clear evidence that vitamin C, vitamin E, echinacea, or exercise reduces the chance of developing a cold. However, it is always recommended to get plenty of rest, exercise, and practice good nutrition.  Contact a health care provider if:  · You are getting worse rather than better.  · Your symptoms are not controlled by medicine.  · You have chills.  · You have worsening shortness of breath.  · You have brown  or red mucus.  · You have yellow or brown nasal discharge.  · You have pain in your face, especially when you bend forward.  · You have a fever.  · You have swollen neck glands.  · You have pain while swallowing.  · You have white areas in the back of your throat.  Get help right away if:  · You have severe or persistent:  ¨ Headache.  ¨ Ear pain.  ¨ Sinus pain.  ¨ Chest pain.  · You have chronic lung disease and any of the following:  ¨ Wheezing.  ¨ Prolonged cough.  ¨ Coughing up blood.  ¨ A change in your usual mucus.  · You have a stiff neck.  · You have changes in your:  ¨ Vision.  ¨ Hearing.  ¨ Thinking.  ¨ Mood.  This information is not intended to replace advice given to you by your health care provider. Make sure you discuss any questions you have with your health care provider.  Document Released: 06/13/2002 Document Revised: 08/20/2017 Document Reviewed: 03/25/2015  [employer] Interactive Patient Education © 2017 [employer]

## 2019-10-30 LAB
ABO GROUP BLD: NORMAL
BLD GP AB SCN SERPL QL: NEGATIVE
HIV 1+2 AB+HIV1 P24 AG SERPL QL IA: NORMAL
RH BLD: POSITIVE
RUBV IGG SERPL IA-ACNC: NORMAL
TREPONEMA PALLIDUM IGG+IGM AB [PRESENCE] IN SERUM OR PLASMA BY IMMUNOASSAY: NORMAL

## 2020-04-23 LAB — GP B STREP DNA SPEC QL NAA+PROBE: NEGATIVE

## 2020-05-26 ENCOUNTER — HOSPITAL ENCOUNTER (INPATIENT)
Facility: MEDICAL CENTER | Age: 35
LOS: 2 days | End: 2020-05-28
Attending: OBSTETRICS & GYNECOLOGY | Admitting: OBSTETRICS & GYNECOLOGY
Payer: COMMERCIAL

## 2020-05-26 ENCOUNTER — ANESTHESIA EVENT (OUTPATIENT)
Dept: OBGYN | Facility: MEDICAL CENTER | Age: 35
End: 2020-05-26
Payer: COMMERCIAL

## 2020-05-26 ENCOUNTER — ANESTHESIA (OUTPATIENT)
Dept: OBGYN | Facility: MEDICAL CENTER | Age: 35
End: 2020-05-26
Payer: COMMERCIAL

## 2020-05-26 DIAGNOSIS — G89.18 POST-OPERATIVE PAIN: ICD-10-CM

## 2020-05-26 LAB
BASOPHILS # BLD AUTO: 0.3 % (ref 0–1.8)
BASOPHILS # BLD: 0.03 K/UL (ref 0–0.12)
EOSINOPHIL # BLD AUTO: 0 K/UL (ref 0–0.51)
EOSINOPHIL NFR BLD: 0 % (ref 0–6.9)
ERYTHROCYTE [DISTWIDTH] IN BLOOD BY AUTOMATED COUNT: 46.7 FL (ref 35.9–50)
HCT VFR BLD AUTO: 37.2 % (ref 37–47)
HGB BLD-MCNC: 12.8 G/DL (ref 12–16)
HOLDING TUBE BB 8507: NORMAL
IMM GRANULOCYTES # BLD AUTO: 0.05 K/UL (ref 0–0.11)
IMM GRANULOCYTES NFR BLD AUTO: 0.5 % (ref 0–0.9)
LYMPHOCYTES # BLD AUTO: 0.96 K/UL (ref 1–4.8)
LYMPHOCYTES NFR BLD: 8.6 % (ref 22–41)
MCH RBC QN AUTO: 33 PG (ref 27–33)
MCHC RBC AUTO-ENTMCNC: 34.4 G/DL (ref 33.6–35)
MCV RBC AUTO: 95.9 FL (ref 81.4–97.8)
MONOCYTES # BLD AUTO: 0.44 K/UL (ref 0–0.85)
MONOCYTES NFR BLD AUTO: 4 % (ref 0–13.4)
NEUTROPHILS # BLD AUTO: 9.63 K/UL (ref 2–7.15)
NEUTROPHILS NFR BLD: 86.6 % (ref 44–72)
NRBC # BLD AUTO: 0 K/UL
NRBC BLD-RTO: 0 /100 WBC
PLATELET # BLD AUTO: 146 K/UL (ref 164–446)
PMV BLD AUTO: 12.1 FL (ref 9–12.9)
RBC # BLD AUTO: 3.88 M/UL (ref 4.2–5.4)
WBC # BLD AUTO: 11.1 K/UL (ref 4.8–10.8)

## 2020-05-26 PROCEDURE — 700105 HCHG RX REV CODE 258: Performed by: ANESTHESIOLOGY

## 2020-05-26 PROCEDURE — 700101 HCHG RX REV CODE 250: Performed by: ANESTHESIOLOGY

## 2020-05-26 PROCEDURE — 160009 HCHG ANES TIME/MIN: Performed by: OBSTETRICS & GYNECOLOGY

## 2020-05-26 PROCEDURE — 770002 HCHG ROOM/CARE - OB PRIVATE (112)

## 2020-05-26 PROCEDURE — A4450 NON-WATERPROOF TAPE: HCPCS | Performed by: OBSTETRICS & GYNECOLOGY

## 2020-05-26 PROCEDURE — 160035 HCHG PACU - 1ST 60 MINS PHASE I: Performed by: OBSTETRICS & GYNECOLOGY

## 2020-05-26 PROCEDURE — 160002 HCHG RECOVERY MINUTES (STAT): Performed by: OBSTETRICS & GYNECOLOGY

## 2020-05-26 PROCEDURE — 700102 HCHG RX REV CODE 250 W/ 637 OVERRIDE(OP): Performed by: ANESTHESIOLOGY

## 2020-05-26 PROCEDURE — 85025 COMPLETE CBC W/AUTO DIFF WBC: CPT

## 2020-05-26 PROCEDURE — 700111 HCHG RX REV CODE 636 W/ 250 OVERRIDE (IP): Performed by: OBSTETRICS & GYNECOLOGY

## 2020-05-26 PROCEDURE — 700105 HCHG RX REV CODE 258

## 2020-05-26 PROCEDURE — 160041 HCHG SURGERY MINUTES - EA ADDL 1 MIN LEVEL 4: Performed by: OBSTETRICS & GYNECOLOGY

## 2020-05-26 PROCEDURE — 700111 HCHG RX REV CODE 636 W/ 250 OVERRIDE (IP): Performed by: ANESTHESIOLOGY

## 2020-05-26 PROCEDURE — 59514 CESAREAN DELIVERY ONLY: CPT | Mod: 80 | Performed by: OBSTETRICS & GYNECOLOGY

## 2020-05-26 PROCEDURE — 700111 HCHG RX REV CODE 636 W/ 250 OVERRIDE (IP)

## 2020-05-26 PROCEDURE — 501445 HCHG STAPLER, SKIN DISP: Performed by: OBSTETRICS & GYNECOLOGY

## 2020-05-26 PROCEDURE — 36415 COLL VENOUS BLD VENIPUNCTURE: CPT

## 2020-05-26 PROCEDURE — A9270 NON-COVERED ITEM OR SERVICE: HCPCS | Performed by: ANESTHESIOLOGY

## 2020-05-26 PROCEDURE — 700105 HCHG RX REV CODE 258: Performed by: OBSTETRICS & GYNECOLOGY

## 2020-05-26 PROCEDURE — 160029 HCHG SURGERY MINUTES - 1ST 30 MINS LEVEL 4: Performed by: OBSTETRICS & GYNECOLOGY

## 2020-05-26 PROCEDURE — 10907ZC DRAINAGE OF AMNIOTIC FLUID, THERAPEUTIC FROM PRODUCTS OF CONCEPTION, VIA NATURAL OR ARTIFICIAL OPENING: ICD-10-PCS | Performed by: OBSTETRICS & GYNECOLOGY

## 2020-05-26 PROCEDURE — 160048 HCHG OR STATISTICAL LEVEL 1-5: Performed by: OBSTETRICS & GYNECOLOGY

## 2020-05-26 RX ORDER — SODIUM CHLORIDE, SODIUM GLUCONATE, SODIUM ACETATE, POTASSIUM CHLORIDE AND MAGNESIUM CHLORIDE 526; 502; 368; 37; 30 MG/100ML; MG/100ML; MG/100ML; MG/100ML; MG/100ML
1500 INJECTION, SOLUTION INTRAVENOUS ONCE
Status: COMPLETED | OUTPATIENT
Start: 2020-05-26 | End: 2020-05-26

## 2020-05-26 RX ORDER — HYDROCODONE BITARTRATE AND ACETAMINOPHEN 5; 325 MG/1; MG/1
1 TABLET ORAL EVERY 4 HOURS PRN
Status: CANCELLED | OUTPATIENT
Start: 2020-05-26

## 2020-05-26 RX ORDER — METOCLOPRAMIDE HYDROCHLORIDE 5 MG/ML
10 INJECTION INTRAMUSCULAR; INTRAVENOUS ONCE
Status: COMPLETED | OUTPATIENT
Start: 2020-05-26 | End: 2020-05-26

## 2020-05-26 RX ORDER — CITRIC ACID/SODIUM CITRATE 334-500MG
30 SOLUTION, ORAL ORAL ONCE
Status: COMPLETED | OUTPATIENT
Start: 2020-05-26 | End: 2020-05-26

## 2020-05-26 RX ORDER — DEXAMETHASONE SODIUM PHOSPHATE 4 MG/ML
INJECTION, SOLUTION INTRA-ARTICULAR; INTRALESIONAL; INTRAMUSCULAR; INTRAVENOUS; SOFT TISSUE PRN
Status: DISCONTINUED | OUTPATIENT
Start: 2020-05-26 | End: 2020-05-26 | Stop reason: SURG

## 2020-05-26 RX ORDER — SODIUM CHLORIDE, SODIUM LACTATE, POTASSIUM CHLORIDE, CALCIUM CHLORIDE 600; 310; 30; 20 MG/100ML; MG/100ML; MG/100ML; MG/100ML
INJECTION, SOLUTION INTRAVENOUS
Status: COMPLETED
Start: 2020-05-26 | End: 2020-05-26

## 2020-05-26 RX ORDER — AZITHROMYCIN 500 MG/5ML
500 INJECTION, POWDER, LYOPHILIZED, FOR SOLUTION INTRAVENOUS EVERY 24 HOURS
Status: COMPLETED | OUTPATIENT
Start: 2020-05-26 | End: 2020-05-26

## 2020-05-26 RX ORDER — CEFAZOLIN SODIUM 1 G/3ML
INJECTION, POWDER, FOR SOLUTION INTRAMUSCULAR; INTRAVENOUS PRN
Status: DISCONTINUED | OUTPATIENT
Start: 2020-05-26 | End: 2020-05-26 | Stop reason: SURG

## 2020-05-26 RX ORDER — BUPIVACAINE HYDROCHLORIDE 2.5 MG/ML
INJECTION, SOLUTION EPIDURAL; INFILTRATION; INTRACAUDAL PRN
Status: DISCONTINUED | OUTPATIENT
Start: 2020-05-26 | End: 2020-05-26 | Stop reason: SURG

## 2020-05-26 RX ORDER — MISOPROSTOL 200 UG/1
800 TABLET ORAL
Status: DISCONTINUED | OUTPATIENT
Start: 2020-05-26 | End: 2020-05-27

## 2020-05-26 RX ORDER — ONDANSETRON 4 MG/1
4 TABLET, ORALLY DISINTEGRATING ORAL EVERY 6 HOURS PRN
Status: CANCELLED | OUTPATIENT
Start: 2020-05-26

## 2020-05-26 RX ORDER — ACETAMINOPHEN 325 MG/1
325 TABLET ORAL EVERY 4 HOURS PRN
Status: CANCELLED | OUTPATIENT
Start: 2020-05-26

## 2020-05-26 RX ORDER — LIDOCAINE HCL/EPINEPHRINE/PF 2%-1:200K
VIAL (ML) INJECTION PRN
Status: DISCONTINUED | OUTPATIENT
Start: 2020-05-26 | End: 2020-05-26 | Stop reason: SURG

## 2020-05-26 RX ORDER — HYDROCODONE BITARTRATE AND ACETAMINOPHEN 10; 325 MG/1; MG/1
1 TABLET ORAL EVERY 4 HOURS PRN
Status: CANCELLED | OUTPATIENT
Start: 2020-05-26

## 2020-05-26 RX ORDER — ONDANSETRON 2 MG/ML
4 INJECTION INTRAMUSCULAR; INTRAVENOUS EVERY 6 HOURS PRN
Status: CANCELLED | OUTPATIENT
Start: 2020-05-26

## 2020-05-26 RX ORDER — ONDANSETRON 2 MG/ML
INJECTION INTRAMUSCULAR; INTRAVENOUS
Status: ACTIVE
Start: 2020-05-26 | End: 2020-05-27

## 2020-05-26 RX ORDER — CITRIC ACID/SODIUM CITRATE 334-500MG
30 SOLUTION, ORAL ORAL EVERY 6 HOURS PRN
Status: DISCONTINUED | OUTPATIENT
Start: 2020-05-26 | End: 2020-05-27 | Stop reason: HOSPADM

## 2020-05-26 RX ORDER — ONDANSETRON 2 MG/ML
4 INJECTION INTRAMUSCULAR; INTRAVENOUS EVERY 4 HOURS PRN
Status: DISCONTINUED | OUTPATIENT
Start: 2020-05-26 | End: 2020-05-28 | Stop reason: HOSPADM

## 2020-05-26 RX ORDER — DEXTROSE, SODIUM CHLORIDE, SODIUM LACTATE, POTASSIUM CHLORIDE, AND CALCIUM CHLORIDE 5; .6; .31; .03; .02 G/100ML; G/100ML; G/100ML; G/100ML; G/100ML
INJECTION, SOLUTION INTRAVENOUS CONTINUOUS
Status: DISCONTINUED | OUTPATIENT
Start: 2020-05-26 | End: 2020-05-28 | Stop reason: HOSPADM

## 2020-05-26 RX ORDER — IBUPROFEN 600 MG/1
600 TABLET ORAL EVERY 6 HOURS PRN
Status: CANCELLED | OUTPATIENT
Start: 2020-05-26

## 2020-05-26 RX ORDER — ROPIVACAINE HYDROCHLORIDE 2 MG/ML
INJECTION, SOLUTION EPIDURAL; INFILTRATION; PERINEURAL
Status: COMPLETED
Start: 2020-05-26 | End: 2020-05-26

## 2020-05-26 RX ORDER — SODIUM CHLORIDE, SODIUM LACTATE, POTASSIUM CHLORIDE, CALCIUM CHLORIDE 600; 310; 30; 20 MG/100ML; MG/100ML; MG/100ML; MG/100ML
INJECTION, SOLUTION INTRAVENOUS CONTINUOUS
Status: DISPENSED | OUTPATIENT
Start: 2020-05-26 | End: 2020-05-26

## 2020-05-26 RX ADMIN — CEFAZOLIN 2 G: 330 INJECTION, POWDER, FOR SOLUTION INTRAMUSCULAR; INTRAVENOUS at 22:31

## 2020-05-26 RX ADMIN — AZITHROMYCIN FOR INJECTION INJECTION, POWDER, LYOPHILIZED, FOR SOLUTION 500 MG: 500 INJECTION INTRAVENOUS at 22:07

## 2020-05-26 RX ADMIN — FAMOTIDINE 20 MG: 10 INJECTION, SOLUTION INTRAVENOUS at 22:08

## 2020-05-26 RX ADMIN — SODIUM CHLORIDE, POTASSIUM CHLORIDE, SODIUM LACTATE AND CALCIUM CHLORIDE 1000 ML: 600; 310; 30; 20 INJECTION, SOLUTION INTRAVENOUS at 10:07

## 2020-05-26 RX ADMIN — AZITHROMYCIN MONOHYDRATE 500 MG: 500 INJECTION, POWDER, LYOPHILIZED, FOR SOLUTION INTRAVENOUS at 22:24

## 2020-05-26 RX ADMIN — ROPIVACAINE HYDROCHLORIDE 200 MG: 2 INJECTION, SOLUTION EPIDURAL; INFILTRATION at 18:57

## 2020-05-26 RX ADMIN — SODIUM CITRATE AND CITRIC ACID MONOHYDRATE 30 ML: 500; 334 SOLUTION ORAL at 22:07

## 2020-05-26 RX ADMIN — FENTANYL CITRATE 50 MCG: 50 INJECTION INTRAMUSCULAR; INTRAVENOUS at 22:56

## 2020-05-26 RX ADMIN — METOCLOPRAMIDE 10 MG: 5 INJECTION, SOLUTION INTRAMUSCULAR; INTRAVENOUS at 22:07

## 2020-05-26 RX ADMIN — SODIUM CHLORIDE, POTASSIUM CHLORIDE, SODIUM LACTATE AND CALCIUM CHLORIDE 1000 ML: 600; 310; 30; 20 INJECTION, SOLUTION INTRAVENOUS at 10:50

## 2020-05-26 RX ADMIN — DEXAMETHASONE SODIUM PHOSPHATE 4 MG: 4 INJECTION, SOLUTION INTRA-ARTICULAR; INTRALESIONAL; INTRAMUSCULAR; INTRAVENOUS; SOFT TISSUE at 22:31

## 2020-05-26 RX ADMIN — SODIUM CHLORIDE, SODIUM GLUCONATE, SODIUM ACETATE, POTASSIUM CHLORIDE AND MAGNESIUM CHLORIDE 1500 ML: 526; 502; 368; 37; 30 INJECTION, SOLUTION INTRAVENOUS at 21:50

## 2020-05-26 RX ADMIN — OXYTOCIN 2 ML: 10 INJECTION, SOLUTION INTRAMUSCULAR; INTRAVENOUS at 23:28

## 2020-05-26 RX ADMIN — BUPIVACAINE HYDROCHLORIDE 10 ML: 2.5 INJECTION, SOLUTION EPIDURAL; INFILTRATION; INTRACAUDAL; PERINEURAL at 22:25

## 2020-05-26 RX ADMIN — SODIUM CHLORIDE, POTASSIUM CHLORIDE, SODIUM LACTATE AND CALCIUM CHLORIDE: 600; 310; 30; 20 INJECTION, SOLUTION INTRAVENOUS at 17:58

## 2020-05-26 RX ADMIN — FENTANYL CITRATE 100 MCG: 50 INJECTION INTRAMUSCULAR; INTRAVENOUS at 22:25

## 2020-05-26 RX ADMIN — LIDOCAINE HYDROCHLORIDE,EPINEPHRINE BITARTRATE 100 ML: 20; .005 INJECTION, SOLUTION EPIDURAL; INFILTRATION; INTRACAUDAL; PERINEURAL at 22:29

## 2020-05-26 RX ADMIN — ROPIVACAINE HYDROCHLORIDE 200 MG: 2 INJECTION, SOLUTION EPIDURAL; INFILTRATION at 10:42

## 2020-05-26 RX ADMIN — OXYTOCIN 2 ML: 10 INJECTION, SOLUTION INTRAMUSCULAR; INTRAVENOUS at 22:41

## 2020-05-26 ASSESSMENT — COPD QUESTIONNAIRES
HAVE YOU SMOKED AT LEAST 100 CIGARETTES IN YOUR ENTIRE LIFE: NO/DON'T KNOW
DO YOU EVER COUGH UP ANY MUCUS OR PHLEGM?: NO/ONLY WITH OCCASIONAL COLDS OR INFECTIONS
IN THE PAST 12 MONTHS DO YOU DO LESS THAN YOU USED TO BECAUSE OF YOUR BREATHING PROBLEMS: DISAGREE/UNSURE
COPD SCREENING SCORE: 0
DURING THE PAST 4 WEEKS HOW MUCH DID YOU FEEL SHORT OF BREATH: NONE/LITTLE OF THE TIME

## 2020-05-26 ASSESSMENT — LIFESTYLE VARIABLES
ALCOHOL_USE: NO
EVER_SMOKED: NEVER

## 2020-05-26 ASSESSMENT — PATIENT HEALTH QUESTIONNAIRE - PHQ9
1. LITTLE INTEREST OR PLEASURE IN DOING THINGS: NOT AT ALL
2. FEELING DOWN, DEPRESSED, IRRITABLE, OR HOPELESS: NOT AT ALL
SUM OF ALL RESPONSES TO PHQ9 QUESTIONS 1 AND 2: 0

## 2020-05-26 ASSESSMENT — PAIN SCALES - GENERAL: PAIN_LEVEL: 2

## 2020-05-26 NOTE — PROGRESS NOTES
"Obstetrics and Gynecology  Labor and Delivery Progress Note    ID/CC: 34 y.o. is a  at 40w2d, labor    S: Comfy with epidural, but now with nausea.    O: /68   Pulse 100   Temp 36.4 °C (97.6 °F) (Temporal)   Resp 20   Ht 1.59 m (5' 2.6\")   Wt 77.1 kg (170 lb)   LMP  (LMP Unknown)   BMI 30.50 kg/m²    Gen: NAD, AAO  FHT: 140/mod cy/+accels, intermittent variable decels  [place]: q3-4min  SVE: 8-9/100/0, more cervix on LEFT    A/P: Demetra Lomeli is a 34 y.o.  at 40w2d, labor.  AVSS.  Cat II FHT.  *Labor: s/p AROM, now 8-9cm.  Making cervical change.  Anticipate vaginal delivery.   - Recheck cx in 2h or as clinically indicated.    *FWB: Reassuring, reactive, over all though Cat II FHT for intermittent variable decelerations.     - CEFM   - RT at delivery for meconium  *Pain: epidural providing good relief  *Nausea: will try ondansetron.  *Rh+/RubImm/GBSneg   - Clears    Malcolm Jurado M.D., 2020, 4:31 PM   "

## 2020-05-26 NOTE — ANESTHESIA PROCEDURE NOTES
Epidural Block    Date/Time: 5/26/2020 10:35 AM  Performed by: Jm Salinas M.D.  Authorized by: Jm Salinas M.D.     Patient Location:  OB  Start Time:  5/26/2020 10:35 AM  Reason for Block: labor analgesia    patient identified, IV checked, site marked, risks and benefits discussed, surgical consent, monitors and equipment checked, pre-op evaluation and timeout performed    Patient Position:  Sitting  Prep: ChloraPrep, patient draped and sterile technique    Monitoring:  Blood pressure, continuous pulse oximetry and heart rate  Approach:  Midline  Location:  L2-L3  Injection Technique:  PATRICIA saline  Skin infiltration:  Lidocaine  Strength:  1%  Dose:  3ml  Needle Type:  Tuohy  Needle Gauge:  17 G  Needle Length:  3.5 in  Loss of resistance::  5  Catheter Size:  19 G  Catheter at Skin Depth:  10  Test Dose Result:  Negative

## 2020-05-26 NOTE — H&P
"Obstetrics and Gynecology  Labor and Delivery History and Physical    Date of Admission: 2020      ID: 34 y.o.  with IUP at 40w2d     Primary OB: Andrea Ye M.D.    Attending OB: Malcolm Jurado M.D.    CC: CTX    HPI: Demetra Lomeli is a 34 y.o.  at 40w2d by IVF dating, who presents with CTX that began at ~0130 this am.  They became regular around 0500.  They were q3min prompting presentation.  -LOF.  -VB.  +FM.  Denies fever cough, Covid exposures.   Current pregnancy has been complicated by polyhyddramnios and IVF pregnancy.      ROS: 10 systems reviewed and negative except as noted above.    Obstetric History   OB History    Para Term  AB Living   2 1 1 0 0 1   SAB TAB Ectopic Molar Multiple Live Births   0 0 0 0 0 1      # Outcome Date GA Lbr Chidi/2nd Weight Sex Delivery Anes PTL Lv   2 Current            1 Term 18 40w2d  2.722 kg (6 lb) F Vag-Spont EPI N HEENA      Complications: Meconium in amniotic fluid           Past Medical History  Surgical History   PCOS none      Gynecologic History  Social History   Irregular menses prior to pregnancy  Denies Hx of abnormal pap smears.  Denies Hx of STIs, specifically denies HSV Tobacco: denies  EtOH: denies  Street Drugs: denies  Works in [place].      Medications  Allergies   No current facility-administered medications on file prior to encounter.      Current Outpatient Medications on File Prior to Encounter   Medication Sig Dispense Refill   • metFORMIN (GLUCOPHAGE) 500 MG Tab   0   • ibuprofen (MOTRIN) 600 MG Tab Take 1 Tab by mouth every 6 hours as needed (For cramping after delivery; do not give if patient is receiving ketorolac (Toradol)). 30 Tab 1   • Prenatal Vit-Fe Fumarate-FA (PRENATAL 1+1 PO) Take  by mouth.      No Known Allergies     Family History   Non-contributory        O: /57   Pulse (!) 102   Temp 36.6 °C (97.9 °F) (Temporal)   Resp 18   Ht 1.59 m (5' 2.6\")   Wt 77.1 kg (170 lb)   " LMP  (LMP Unknown)   BMI 30.50 kg/m²       Gen: NAD, AAO  Resp: unlabored  Abd: Gravid, NTTP,Cephalic by [place], No rebound or guarding  Ext: NTTP, no edema, 2+DPP  Pelvic: SVE /-1    FHT:  135/mod cy/+accels, -decels  [place]: CTX q2-4min    Labs:   Lab Results   Component Value Date/Time    WBC 11.1 (H) 2020 10:00 AM    RBC 3.88 (L) 2020 10:00 AM    HEMOGLOBIN 12.8 2020 10:00 AM    HEMATOCRIT 37.2 2020 10:00 AM    MCV 95.9 2020 10:00 AM    RDW 46.7 2020 10:00 AM    PLATELETCT 146 (L) 2020 10:00 AM       Prenatal labs:   Lab  Result    Rh  positive    Antibody screen  negative    Rubella  immune    HIV  Non-reactive    RPR  Non-reactive    HBsAg  negative    Urine Culture  no growth    Gonorrhea/chlamydia  neg/neg    Aneuploidy screening  PGD euploid male    1h Glucose  132 wnl    GBS  neg       A/P: Demetra Lomeli is a  at 40w2d by IVF dating who presents with CTX and is found to be transitioning into active labor.  AVSS.  Cat I FHT.  *Admit to L&D  *IV, CBC, T&S on hold  *Labor: Now 5cm dilated with BBOW.  Will allow epidural to set up some more, then plan active management of labor with AROM.  *FWB: Reassuring, reactive, Cat I FHT.  CEFM.  *Pain: epidural providing good relief  *Global: Rh+, RubImm, GBS neg.    - Clears    Malcolm Jurado MD, MS,  2020, 11:19 AM

## 2020-05-27 LAB
ERYTHROCYTE [DISTWIDTH] IN BLOOD BY AUTOMATED COUNT: 43.9 FL (ref 35.9–50)
HCT VFR BLD AUTO: 27.5 % (ref 37–47)
HGB BLD-MCNC: 9.7 G/DL (ref 12–16)
MCH RBC QN AUTO: 32.9 PG (ref 27–33)
MCHC RBC AUTO-ENTMCNC: 35.3 G/DL (ref 33.6–35)
MCV RBC AUTO: 93.2 FL (ref 81.4–97.8)
PLATELET # BLD AUTO: 116 K/UL (ref 164–446)
PMV BLD AUTO: 11.6 FL (ref 9–12.9)
RBC # BLD AUTO: 2.95 M/UL (ref 4.2–5.4)
WBC # BLD AUTO: 13 K/UL (ref 4.8–10.8)

## 2020-05-27 PROCEDURE — 303615 HCHG EPIDURAL/SPINAL ANESTHESIA FOR LABOR

## 2020-05-27 PROCEDURE — 85027 COMPLETE CBC AUTOMATED: CPT

## 2020-05-27 PROCEDURE — 700102 HCHG RX REV CODE 250 W/ 637 OVERRIDE(OP): Performed by: OBSTETRICS & GYNECOLOGY

## 2020-05-27 PROCEDURE — 770002 HCHG ROOM/CARE - OB PRIVATE (112)

## 2020-05-27 PROCEDURE — A9270 NON-COVERED ITEM OR SERVICE: HCPCS | Performed by: OBSTETRICS & GYNECOLOGY

## 2020-05-27 PROCEDURE — 36415 COLL VENOUS BLD VENIPUNCTURE: CPT

## 2020-05-27 PROCEDURE — 700111 HCHG RX REV CODE 636 W/ 250 OVERRIDE (IP): Performed by: OBSTETRICS & GYNECOLOGY

## 2020-05-27 PROCEDURE — 700112 HCHG RX REV CODE 229: Performed by: OBSTETRICS & GYNECOLOGY

## 2020-05-27 RX ORDER — HYDROMORPHONE HYDROCHLORIDE 1 MG/ML
0.1 INJECTION, SOLUTION INTRAMUSCULAR; INTRAVENOUS; SUBCUTANEOUS
Status: DISCONTINUED | OUTPATIENT
Start: 2020-05-27 | End: 2020-05-27 | Stop reason: HOSPADM

## 2020-05-27 RX ORDER — DIPHENHYDRAMINE HYDROCHLORIDE 50 MG/ML
25 INJECTION INTRAMUSCULAR; INTRAVENOUS EVERY 6 HOURS PRN
Status: DISCONTINUED | OUTPATIENT
Start: 2020-05-27 | End: 2020-05-28 | Stop reason: HOSPADM

## 2020-05-27 RX ORDER — KETOROLAC TROMETHAMINE 30 MG/ML
30 INJECTION, SOLUTION INTRAMUSCULAR; INTRAVENOUS EVERY 6 HOURS
Status: DISCONTINUED | OUTPATIENT
Start: 2020-05-27 | End: 2020-05-27

## 2020-05-27 RX ORDER — IBUPROFEN 600 MG/1
600 TABLET ORAL EVERY 6 HOURS PRN
Status: DISCONTINUED | OUTPATIENT
Start: 2020-05-27 | End: 2020-05-28 | Stop reason: HOSPADM

## 2020-05-27 RX ORDER — OXYCODONE HYDROCHLORIDE 10 MG/1
10 TABLET ORAL EVERY 4 HOURS PRN
Status: DISCONTINUED | OUTPATIENT
Start: 2020-05-27 | End: 2020-05-28 | Stop reason: HOSPADM

## 2020-05-27 RX ORDER — MORPHINE SULFATE 4 MG/ML
4 INJECTION, SOLUTION INTRAMUSCULAR; INTRAVENOUS
Status: DISCONTINUED | OUTPATIENT
Start: 2020-05-27 | End: 2020-05-28 | Stop reason: HOSPADM

## 2020-05-27 RX ORDER — DIPHENHYDRAMINE HYDROCHLORIDE 50 MG/ML
12.5 INJECTION INTRAMUSCULAR; INTRAVENOUS
Status: DISCONTINUED | OUTPATIENT
Start: 2020-05-27 | End: 2020-05-27 | Stop reason: HOSPADM

## 2020-05-27 RX ORDER — HYDROMORPHONE HYDROCHLORIDE 1 MG/ML
0.2 INJECTION, SOLUTION INTRAMUSCULAR; INTRAVENOUS; SUBCUTANEOUS
Status: DISCONTINUED | OUTPATIENT
Start: 2020-05-27 | End: 2020-05-27 | Stop reason: HOSPADM

## 2020-05-27 RX ORDER — ACETAMINOPHEN 500 MG
1000 TABLET ORAL EVERY 6 HOURS
Status: DISCONTINUED | OUTPATIENT
Start: 2020-05-27 | End: 2020-05-27

## 2020-05-27 RX ORDER — MEPERIDINE HYDROCHLORIDE 25 MG/ML
6.25 INJECTION INTRAMUSCULAR; INTRAVENOUS; SUBCUTANEOUS
Status: DISCONTINUED | OUTPATIENT
Start: 2020-05-27 | End: 2020-05-27 | Stop reason: HOSPADM

## 2020-05-27 RX ORDER — ONDANSETRON 4 MG/1
4 TABLET, ORALLY DISINTEGRATING ORAL EVERY 6 HOURS PRN
Status: DISCONTINUED | OUTPATIENT
Start: 2020-05-27 | End: 2020-05-28 | Stop reason: HOSPADM

## 2020-05-27 RX ORDER — KETOROLAC TROMETHAMINE 30 MG/ML
30 INJECTION, SOLUTION INTRAMUSCULAR; INTRAVENOUS EVERY 6 HOURS
Status: DISPENSED | OUTPATIENT
Start: 2020-05-27 | End: 2020-05-27

## 2020-05-27 RX ORDER — OXYCODONE HYDROCHLORIDE 5 MG/1
5 TABLET ORAL EVERY 4 HOURS PRN
Status: DISCONTINUED | OUTPATIENT
Start: 2020-05-27 | End: 2020-05-27

## 2020-05-27 RX ORDER — MISOPROSTOL 200 UG/1
800 TABLET ORAL
Status: DISCONTINUED | OUTPATIENT
Start: 2020-05-27 | End: 2020-05-28 | Stop reason: HOSPADM

## 2020-05-27 RX ORDER — HALOPERIDOL 5 MG/ML
1 INJECTION INTRAMUSCULAR
Status: DISCONTINUED | OUTPATIENT
Start: 2020-05-27 | End: 2020-05-27 | Stop reason: HOSPADM

## 2020-05-27 RX ORDER — DIPHENHYDRAMINE HCL 25 MG
25 TABLET ORAL EVERY 6 HOURS PRN
Status: DISCONTINUED | OUTPATIENT
Start: 2020-05-27 | End: 2020-05-28 | Stop reason: HOSPADM

## 2020-05-27 RX ORDER — ONDANSETRON 2 MG/ML
4 INJECTION INTRAMUSCULAR; INTRAVENOUS
Status: DISCONTINUED | OUTPATIENT
Start: 2020-05-27 | End: 2020-05-27 | Stop reason: HOSPADM

## 2020-05-27 RX ORDER — DOCUSATE SODIUM 100 MG/1
100 CAPSULE, LIQUID FILLED ORAL 2 TIMES DAILY PRN
Status: DISCONTINUED | OUTPATIENT
Start: 2020-05-27 | End: 2020-05-28 | Stop reason: HOSPADM

## 2020-05-27 RX ORDER — OXYCODONE HYDROCHLORIDE 5 MG/1
5 TABLET ORAL EVERY 4 HOURS PRN
Status: DISCONTINUED | OUTPATIENT
Start: 2020-05-27 | End: 2020-05-28 | Stop reason: HOSPADM

## 2020-05-27 RX ORDER — BISACODYL 10 MG
10 SUPPOSITORY, RECTAL RECTAL PRN
Status: DISCONTINUED | OUTPATIENT
Start: 2020-05-27 | End: 2020-05-28 | Stop reason: HOSPADM

## 2020-05-27 RX ORDER — ACETAMINOPHEN 325 MG/1
650 TABLET ORAL EVERY 6 HOURS
Status: DISCONTINUED | OUTPATIENT
Start: 2020-05-27 | End: 2020-05-27

## 2020-05-27 RX ORDER — OXYCODONE HYDROCHLORIDE 10 MG/1
10 TABLET ORAL EVERY 4 HOURS PRN
Status: DISCONTINUED | OUTPATIENT
Start: 2020-05-27 | End: 2020-05-27

## 2020-05-27 RX ORDER — HYDROMORPHONE HYDROCHLORIDE 1 MG/ML
0.2 INJECTION, SOLUTION INTRAMUSCULAR; INTRAVENOUS; SUBCUTANEOUS
Status: DISCONTINUED | OUTPATIENT
Start: 2020-05-27 | End: 2020-05-27

## 2020-05-27 RX ORDER — SIMETHICONE 80 MG
80 TABLET,CHEWABLE ORAL 4 TIMES DAILY PRN
Status: DISCONTINUED | OUTPATIENT
Start: 2020-05-27 | End: 2020-05-28 | Stop reason: HOSPADM

## 2020-05-27 RX ORDER — VITAMIN A ACETATE, BETA CAROTENE, ASCORBIC ACID, CHOLECALCIFEROL, .ALPHA.-TOCOPHEROL ACETATE, DL-, THIAMINE MONONITRATE, RIBOFLAVIN, NIACINAMIDE, PYRIDOXINE HYDROCHLORIDE, FOLIC ACID, CYANOCOBALAMIN, CALCIUM CARBONATE, FERROUS FUMARATE, ZINC OXIDE, CUPRIC OXIDE 3080; 12; 120; 400; 1; 1.84; 3; 20; 22; 920; 25; 200; 27; 10; 2 [IU]/1; UG/1; MG/1; [IU]/1; MG/1; MG/1; MG/1; MG/1; MG/1; [IU]/1; MG/1; MG/1; MG/1; MG/1; MG/1
1 TABLET, FILM COATED ORAL EVERY MORNING
Status: DISCONTINUED | OUTPATIENT
Start: 2020-05-27 | End: 2020-05-28 | Stop reason: HOSPADM

## 2020-05-27 RX ORDER — SODIUM CHLORIDE, SODIUM LACTATE, POTASSIUM CHLORIDE, CALCIUM CHLORIDE 600; 310; 30; 20 MG/100ML; MG/100ML; MG/100ML; MG/100ML
INJECTION, SOLUTION INTRAVENOUS PRN
Status: DISCONTINUED | OUTPATIENT
Start: 2020-05-27 | End: 2020-05-28 | Stop reason: HOSPADM

## 2020-05-27 RX ORDER — ONDANSETRON 2 MG/ML
4 INJECTION INTRAMUSCULAR; INTRAVENOUS EVERY 6 HOURS PRN
Status: DISCONTINUED | OUTPATIENT
Start: 2020-05-27 | End: 2020-05-28 | Stop reason: HOSPADM

## 2020-05-27 RX ORDER — HYDROMORPHONE HYDROCHLORIDE 1 MG/ML
0.4 INJECTION, SOLUTION INTRAMUSCULAR; INTRAVENOUS; SUBCUTANEOUS
Status: DISCONTINUED | OUTPATIENT
Start: 2020-05-27 | End: 2020-05-27 | Stop reason: HOSPADM

## 2020-05-27 RX ADMIN — DOCUSATE SODIUM 100 MG: 100 CAPSULE, LIQUID FILLED ORAL at 06:02

## 2020-05-27 RX ADMIN — IBUPROFEN 600 MG: 600 TABLET ORAL at 21:15

## 2020-05-27 RX ADMIN — KETOROLAC TROMETHAMINE 30 MG: 30 INJECTION, SOLUTION INTRAMUSCULAR at 17:56

## 2020-05-27 RX ADMIN — OXYCODONE HYDROCHLORIDE 5 MG: 5 TABLET ORAL at 21:15

## 2020-05-27 RX ADMIN — SIMETHICONE CHEW TAB 80 MG 80 MG: 80 TABLET ORAL at 15:21

## 2020-05-27 RX ADMIN — OXYCODONE HYDROCHLORIDE 5 MG: 5 TABLET ORAL at 17:15

## 2020-05-27 RX ADMIN — KETOROLAC TROMETHAMINE 30 MG: 30 INJECTION, SOLUTION INTRAMUSCULAR at 12:07

## 2020-05-27 RX ADMIN — SIMETHICONE CHEW TAB 80 MG 80 MG: 80 TABLET ORAL at 06:02

## 2020-05-27 RX ADMIN — VITAMIN A, VITAMIN C, VITAMIN D-3, VITAMIN E, VITAMIN B-1, VITAMIN B-2, NIACIN, VITAMIN B-6, CALCIUM, IRON, ZINC, COPPER 1 TABLET: 4000; 120; 400; 22; 1.84; 3; 20; 10; 1; 12; 200; 27; 25; 2 TABLET ORAL at 06:02

## 2020-05-27 RX ADMIN — KETOROLAC TROMETHAMINE 30 MG: 30 INJECTION, SOLUTION INTRAMUSCULAR at 06:02

## 2020-05-27 ASSESSMENT — EDINBURGH POSTNATAL DEPRESSION SCALE (EPDS)
I HAVE BLAMED MYSELF UNNECESSARILY WHEN THINGS WENT WRONG: YES, SOME OF THE TIME
I HAVE BEEN SO UNHAPPY THAT I HAVE BEEN CRYING: NO, NEVER
I HAVE FELT SCARED OR PANICKY FOR NO GOOD REASON: NO, NOT AT ALL
I HAVE BEEN ANXIOUS OR WORRIED FOR NO GOOD REASON: YES, SOMETIMES
I HAVE LOOKED FORWARD WITH ENJOYMENT TO THINGS: AS MUCH AS I EVER DID
THE THOUGHT OF HARMING MYSELF HAS OCCURRED TO ME: NEVER
THINGS HAVE BEEN GETTING ON TOP OF ME: NO, I HAVE BEEN COPING AS WELL AS EVER
I HAVE FELT SAD OR MISERABLE: NO, NOT AT ALL
I HAVE BEEN SO UNHAPPY THAT I HAVE HAD DIFFICULTY SLEEPING: NOT AT ALL
I HAVE BEEN ABLE TO LAUGH AND SEE THE FUNNY SIDE OF THINGS: AS MUCH AS I ALWAYS COULD

## 2020-05-27 NOTE — PROGRESS NOTES
Admitted from L&D via [place] [facility] in good condition, with IVF of LR with 20 Units of Pitocin infusing well into left hand. Reyes catheter draining to clear yellow urine output, with sequential stockings on both legs. Assessment done fundus firm at umbilicus, lochia rubra minimal. Surgical incision with Mepilex dressing CDI. Denies pain at this time. Will continue to monitor.

## 2020-05-27 NOTE — ANESTHESIA POSTPROCEDURE EVALUATION
Patient: Demetra Lomeli    Procedure Summary     Date:  20 Room / Location:  [facility] OR  / LABOR AND DELIVERY    Anesthesia Start:  1046 Anesthesia Stop:      Procedure:   SECTION, PRIMARY (N/A Abdomen) Diagnosis:  (Same, del)    Surgeon:  Malcolm Jurado M.D. Responsible Provider:  Jm Salinas M.D.    Anesthesia Type:  epidural ASA Status:  2          Final Anesthesia Type: epidural  Last vitals  BP   Blood Pressure: 145/91    Temp   37.7 °C (99.9 °F)    Pulse   Pulse: (!) 103   Resp   20    SpO2   96 %      Anesthesia Post Evaluation    Patient location during evaluation: PACU  Patient participation: complete - patient participated  Level of consciousness: awake and alert  Pain score: 2    Airway patency: patent  Anesthetic complications: no  Cardiovascular status: hemodynamically stable  Respiratory status: acceptable  Hydration status: euvolemic    PONV: none

## 2020-05-27 NOTE — ADDENDUM NOTE
Addendum  created 05/27/20 0550 by Jm Salinas M.D.    Clinical Note Signed, Intraprocedure Blocks edited

## 2020-05-27 NOTE — CARE PLAN
Problem: Alteration in comfort related to surgical incision and/or after birth pains  Goal: Patient is able to ambulate, care for self and infant with acceptable pain level  Note: Patient is able to ambulate     Problem: Potential knowledge deficit related to lack of understanding of self and  care  Goal: Patient will demonstrate ability to care for self and infant  Note: Patient demonstrates ability to care for self and infant.

## 2020-05-27 NOTE — PROGRESS NOTES
2nd stage, pushing for ~1.25h,  Some descent, but still at +1.  Bedside U/S shows ROP position.  Mild caput.  Backing off of pushes 2/2 back pain.  Dr. Salinas has just bolused the patient for this as she was intolerant of attempts at manual rotation. Will try again in about 10-15min after epidural bolus has set up.  She is strength-wise pushing well when she doesn't back off.  Will continue to push.  Notable for 6lb first child, and was EFW of 7lb 15oz on U/S 1 week ago, thus almost 2lb larger EFW.  Between size and persistent OP, increased risks of requiring  delivery.   Malcolm Jurado MD, MS,  2020, 7:31 PM

## 2020-05-27 NOTE — ANESTHESIA TIME REPORT
Anesthesia Start and Stop Event Times     Date Time Event    5/26/2020 1030 Ready for Procedure     1046 Anesthesia Start   STOP TIME IS 6283     Responsible Staff  05/26/20    Name Role Begin End    Jm Salinas M.D. Anesth 1046         Preop Diagnosis (Free Text):  Pre-op Diagnosis     Rest of 2nd stage of labor        Preop Diagnosis (Codes):    Post op Diagnosis  Pregnancy      Premium Reason  A. 3PM - 7AM    Comments:

## 2020-05-27 NOTE — PROGRESS NOTES
- Report received. Pt in the middle of pushing.    - Dr Jurado at bedside. Attempted to turn baby. Baby still OP position confirmed with ultrasound. Orders to start low dose pitocin at 2milliu/min.    - Dr Jurado at bedside. No progress made within last 40 minutes. Decision made to progress with  section.    - Pt rolled to OR 2 in stable condition at this time  233 - Pt transferred to PACU 3 in stable condition via fidelina  4 - Pt transferred to PP in stable condition via fidelina with infant in arms. Report given to Fatimah ESQUEDA. Infant bands matched x2 cuddles active.

## 2020-05-27 NOTE — CARE PLAN
Problem: Altered physiologic condition related to postoperative  delivery  Goal: Patient physiologically stable as evidenced by normal lochia, palpable uterine involution and vital signs within normal limits  Outcome: PROGRESSING AS EXPECTED  Note: Fundus firm at U, lochia rubra minimal. Surgical incision with mepilex dressing CDI. V/S WNL     Problem: Alteration in comfort related to surgical incision and/or after birth pains  Goal: Patient verbalizes acceptable pain level  Outcome: PROGRESSING AS EXPECTED  Note: Patient verbalized acceptable pain level at this time. Will be medicated as scheduled.

## 2020-05-27 NOTE — PROGRESS NOTES
"Obstetrics and Gynecology  Post- Progress Note    CC: POD1 s/p LTCS for arrest of descent.    S: Pt feeling well.  Pain well controlled.  Luda reg diet.  Has not yet ambulated.  Lochia mild. Reyes catheter in place.  +flatus/-BM.  Pt denies CP/SOB, N/V, constipation/diarrhea, lower leg pain.  Breastfeeding going well.    O: /59   Pulse 74   Temp 36.8 °C (98.3 °F) (Temporal)   Resp 18   Ht 1.59 m (5' 2.6\")   Wt 77.1 kg (170 lb)   LMP  (LMP Unknown)   SpO2 95%   Breastfeeding Unknown   BMI 30.50 kg/m² , Temp (24hrs), Av °C (98.6 °F), Min:36.4 °C (97.6 °F), Max:37.7 °C (99.9 °F)       Gen: NAD, AAO    CV: RRR    Pulm: unlabored    Abd: Soft, NT, no rebound/guarding, fundus firm at U-1.    Incision: clean, dry, intact, with Mepilex in place.  No erythema, induration or drainage.    Ext: WWP, no edema, non-tender to palpation    Recent Labs     20  1000 20  0749   WBC 11.1* 13.0*   RBC 3.88* 2.95*   HEMOGLOBIN 12.8 9.7*   HEMATOCRIT 37.2 27.5*   MCV 95.9 93.2   MCH 33.0 32.9   RDW 46.7 43.9   PLATELETCT 146* 116*   MPV 12.1 11.6   NEUTSPOLYS 86.60*  --    LYMPHOCYTES 8.60*  --    MONOCYTES 4.00  --    EOSINOPHILS 0.00  --    BASOPHILS 0.30  --       A/P: Demetra Lomeli is a 34 y.o.  s/p LTCS for arrest of descent.  AVSS.  Recovering well.    - Continue routine postpartum care.    - Encourage ambulation.    - Continue current pain regimen    Anticipate d/c tomorrow    Malcolm Jurado MD, MS,  2020, 8:14 AM    " SW assessment/Intervention:  Patient is observed engaged in the milieu. Patient is a 40year old [facility] American male who presents with thoughts of suicide. Patient reports feelings of depression has caused him to relapse. Patient reports he is in need of treatment and will follow up with care after discharge. SW will continue to assist patient as needed.  
 
Radha Jara, DEANE

## 2020-05-27 NOTE — H&P
ADDENDUM    DATE OF ADMISSION:      IDENTIFICATION:  A 34-year-old G2, P1-0-0-1 with intrauterine pregnancy at 40   weeks 2 days gestational age.    HISTORY OF PRESENT ILLNESS:  This patient presented late this morning with   complaints of contractions, which have been ongoing since approximately 0130   this morning.  They became regular at 0500 prompting presentation when her   contractions became approximately every 3 minutes.  On presentation, she was 5   cm dilated.  She received an epidural for pain control and underwent   artificial rupture of membranes for active management of labor.  The patient   progressed otherwise spontaneously to complete dilation.  She has now been   pushing for 4 hours and 15 minutes.  The patient is known to have a larger   baby than her first (which was 6 pounds).  Approximately 1 week ago, estimated   fetal weight was 7 pounds 15 ounces.  On bedside ultrasound, the fetus was   found to be in direct OP to ROP position throughout her second stage of labor.    I have been unable to manually rotate the head.  The patient has pushed down   to approximately +1 station.  The head has molded significantly and is now   developing a mild-to-moderate amount of caput.  She has not made station   change over approximately the last hour.  She is now exhausted and does desire   to move forward with  delivery.  The risks, benefits and alternatives   were discussed at length with the patient.  The risks specifically discussed   were bleeding with the possible need for a blood transfusion, thrombotic   events, scarring, damage to other organs in the area of operation including   bowel, bladder, blood vessels, nerves, uterus, tubes, ovaries, ureters and   baby.  We discussed the increased risk of the need for  in a future   pregnancy, abnormal insertion of the placenta and a future pregnancy and the   possibility of the need for a life-saving hysterectomy.  The patient    understands these risks and verbalized her understanding of our discussion.    She wishes to move forward with  section.  A written consent will be   signed and placed on the paper chart prior to the procedure.  We will be   moving back to the OR presently.       ____________________________________     MD SARITA Hardy / RODRÍGUEZ    DD:  2020 21:53:33  DT:  2020 23:11:37    D#:  [id number]  Job#:  [id number]

## 2020-05-27 NOTE — OP REPORT
Obstetrics and Gynecology   Operative Note    Patient: Demetra Lomeli   Date: 2020  Surgeon: Malcolm Jurado MD  Assistant: Liz Mendoza MD  Pre-op diagnosis: IUP at 40w2d, labor, arrest of dilation  Post-op diagnosis: same, delivered  Procedure: Primary Low Transverse  Section via Pfannenstiel with double layer uterine closure  Anesthesia: epidural  Anesthesiologist: Anesthesiologist: Jm Salinas M.D.  Pre-op Abx: 2g ancef IV, 500mg azithromycin IV  Findings:  1. Viable male infant in OP position.  2. Delivered at 10:42 PM  on 2020 .  3. APGARs 7 at 1 minute and 8 at 5 minutes  4. Birth Weight: 3.965 kg (8 lb 11.9 oz)   5. Normal appearing uterus, fallopian tubes and ovaries.   6. Meconium stained amniotic fluid.  7. Normal appearing placenta, 3 vessel cord with central cord insertion.  8. Umbilical cord gasses:   2020 22:45   Cord Bg Ph 7.08   Cord Bg Pco2 64.3   Cord Bg Po2 10.1   Cord Bg Hco3 18   Cord Bg Base Excess -13   Cord Bg O2 Saturation <15.0   CV Ph 7.19   CV Pco2 47.9   CV Po2 17.2   CV Hco3 18   CV Base Excess -10   CV O2 Saturation 24.6   EBL: 750mL  UOP: 250mL, clear, straw-colored  Fluids: 1300mL of crystaloid  Specimens/Cultures: umbilical cord gasses  Drains/Retained Objects: pruitt catheter  Complications: none  Condition: good  Disposition: L&D PACU then Mother/Baby Unit  Narrative:    After having reviewed, in detail, the risks, benefits, and alternatives to  delivery, the patient signed the informed consent and wished to proceed with the procedure.  The patient was taken to the operating room with an IV in place.  She the epidural was dosed as her form of anesthesia.  She was placed on the operating table in the dorsal supine position with a leftward tilt.  Under a sterile prep, the patient had a Pruitt catheter placed in her bladder and sequential compression devices placed on her legs for venous thrombotic prophylaxis.  She was prepped  and draped in the usual sterile fashion for a  delivery.  The adequacy of the patient's analgesia was checked with an Allis test.  The patient was adequately anesthetized.     A Pfannenstiel skin incision was made across the lower abdomen sharply with the scalpel.  This was carried down to the underlying layer of fascia with the Bovie.  The fascia was incised on either side of the midline.  This incision was then extended laterally with the Melendrez scissors.  The inferior aspect of the fascial incision was grasped with Kocher clamps and elevated.  The underlying rectal muscles were dissected off the midline raphe, both with blunt dissection as well as sharply with the Melendrez.  Attention was then turned to the superior aspect of the fascial incision, which, in a similar fashion, was grasped with Kocher clamps and elevated.  The rectus muscles were dissected off the midline raphe, both with blunt dissection and sharp dissection with the Melendrez scissor.  The rectus muscles were spread laterally from the midline.  The peritoneum was identified and entered using blunt dissection.      This incision was extended superiorly and inferiorly with good visualization of the bladder using blunt dissection, sharp dissection and with the Bovie.  The vesicouterine peritoneal reflection was noted to be low on the uterus, well out of the planned hysterotomy site.  The uterine peritoneum was grasped with smooth pickups and incised sharply with the Metzenbaum scissors.  This incision was extended superolaterally with the Metzenbaum scissors.  A bladder flap was created digitally.  The bladder blade was placed in the newly created bladder flap.     A low transverse hysterotomy was then made sharply with a scalpel to the level of the membranes.  This incision was extended superolaterally with blunt dissection.  The membranes were ruptured and the fluid character was noted as above.  The fetal head was then grasped and brought out of  deep arrest.  The bladder blade was removed.  However, as the head was brought to the incision, the fetus rotated to transverse lie.  The most accessible fetal part was then the feet which were brought through the incision.  This was followed by the fetal body.  The body was rotated clockwise and the right arm was swept, flexed and delivered.  The fetus was then rotated clockwise and the left arm was swept, flexed and delivered.  The fetal head was flexed and then delivered atraumatically.  The fetal was delivered atraumatically through the incision.   The infant was delivered in whole atraumatically.  The nose and mouth were suctioned with the bulb suction.  The cord were doubly clamped and cut.  The infant was then handed off to the awaiting pediatric resuscitation team including a respiratory therapist.      The placenta was then delivered spontaneously aided by manual expression from the uterus.  The uterus was then exteriorized and cleared of all clots and debris.  The hysterotomy was inspected and no extensions were present.  The hysterotomy was then closed in a running locked fashion with 0 Vicryl.  A second layer of the same suture was then used to create an imbricating layer.  A survey of the hysterotomy revealed there was excellent hemostasis.   The pelvis was irrigated with warm, normal saline and suctioned clear.   Hemostasis was again confirmed. The uterus, tubes, and ovaries were then inspected with the above-findings.  The pelvis was irrigated with warm, normal saline and suctioned clear a second time and another survey of the hysterotomy revealed that was excellent hemostasis was again present.   The peritoneum was closed with a running stitch of 2-0 Vicryl and the rectus muscles were reapproximated with a horizontal mattress stitch of 2-0 Vicryl.    The subfascial structures were inspected for bleeding and small bleeding areas were rendered hemostatic with the Bovie.  No other defects were  identified.  The fascia itself was inspected for bleeding and buttonhole defects, and none were present.  As such, the fascia was then closed with 0 PDS in a running fashion.  The subcutaneous tissues were irrigated with warm, normal saline and dried with a new lap sponge.  They were then inspected for bleeding and small bleeding vessels were rendered hemostatic with the Bovie.  A subcutaneous layer of 2-0 Vicryl was then placed in a running fashion to minimize seroma formation.  The skin was then closed with 3-0 Monocryl in a running fashion.      The patient tolerated the procedure well.  Complications are as noted above.  Sponge, lap, needle, and instrument counts were reported to be correct.  The patient was taken to the recovery room in stable and awake condition with later plans to transfer to [facility].  Dr. Jurado was present throughout and performed the entire procedure.       Malcolm Jurado M.D., M.S.

## 2020-05-28 VITALS
SYSTOLIC BLOOD PRESSURE: 127 MMHG | HEIGHT: 63 IN | BODY MASS INDEX: 30.12 KG/M2 | RESPIRATION RATE: 15 BRPM | HEART RATE: 99 BPM | DIASTOLIC BLOOD PRESSURE: 76 MMHG | TEMPERATURE: 98.9 F | WEIGHT: 170 LBS | OXYGEN SATURATION: 97 %

## 2020-05-28 PROCEDURE — 700102 HCHG RX REV CODE 250 W/ 637 OVERRIDE(OP): Performed by: OBSTETRICS & GYNECOLOGY

## 2020-05-28 PROCEDURE — A9270 NON-COVERED ITEM OR SERVICE: HCPCS | Performed by: OBSTETRICS & GYNECOLOGY

## 2020-05-28 RX ORDER — OXYCODONE HYDROCHLORIDE AND ACETAMINOPHEN 5; 325 MG/1; MG/1
1 TABLET ORAL EVERY 4 HOURS PRN
Qty: 25 TAB | Refills: 0 | Status: SHIPPED | OUTPATIENT
Start: 2020-05-28 | End: 2020-06-04

## 2020-05-28 RX ORDER — IBUPROFEN 600 MG/1
600 TABLET ORAL EVERY 6 HOURS PRN
Qty: 30 TAB | Refills: 1 | Status: ON HOLD | OUTPATIENT
Start: 2020-05-28 | End: 2022-01-31

## 2020-05-28 RX ADMIN — VITAMIN A, VITAMIN C, VITAMIN D-3, VITAMIN E, VITAMIN B-1, VITAMIN B-2, NIACIN, VITAMIN B-6, CALCIUM, IRON, ZINC, COPPER 1 TABLET: 4000; 120; 400; 22; 1.84; 3; 20; 10; 1; 12; 200; 27; 25; 2 TABLET ORAL at 06:07

## 2020-05-28 RX ADMIN — IBUPROFEN 600 MG: 600 TABLET ORAL at 06:07

## 2020-05-28 RX ADMIN — OXYCODONE HYDROCHLORIDE 5 MG: 5 TABLET ORAL at 06:07

## 2020-05-28 NOTE — PROGRESS NOTES
Patient discharged off of unit in wheelchair, infant in car seat in stable condition. Car seat safety check completed. Discharge education completed and AVS signed by patient. Emphasized importance of  screen to be completed following discharge (dates given). Prescriptions handed to patient. Cuddles tag & umbilical clamp removed. All questions answered at this time.

## 2020-05-28 NOTE — PROGRESS NOTES
Bedside report done, patient in bed with FOB at bedside. Denies pain at his time. Will continue to monitor.

## 2020-05-28 NOTE — CARE PLAN
Problem: Altered physiologic condition related to postoperative  delivery  Goal: Patient physiologically stable as evidenced by normal lochia, palpable uterine involution and vital signs within normal limits  Outcome: PROGRESSING AS EXPECTED  Note: Fundus firm at U, lochia rubra minimal. Surgical incision with Mepilex dressing CDI. V/S WNL     Problem: Potential for postpartum infection related to surgical incision, compromised uterine condition, urinary tract or respiratory compromise  Goal: Patient will be afebrile and free from signs and symptoms of infection  Outcome: PROGRESSING AS EXPECTED  Note: Patient has no signs of infection noted at this time. Afebrile

## 2020-05-28 NOTE — PROGRESS NOTES
Progress Note    Demetra Lomeli   Post-op day 2  Chief Admitting Dx: Pregnancy  Labor and delivery indication for care or intervention  Labor and delivery indication for care or intervention  Delivery Type:      Subjective:  Ambulating, voiding, tolerating diet, normal lochia, Pain was a little rougher last night. Baby is latching well.     Vitals:    20 1800 20 2200 20 2230 20 0600   BP: 110/70 (!) 96/51 110/67 127/76   Pulse: 96 88  99   Resp: 17 16  15   Temp: 36.9 °C (98.4 °F) 36.2 °C (97.1 °F)  37.2 °C (98.9 °F)   TempSrc: Temporal Temporal  Temporal   SpO2: 95% 100%  97%   Weight:       Height:           Exam:  Gen: no acute distress  Abdomen: soft nt/nd firm fundus  Inc: clean d/i with dressing. No signs of infection  Ext: no calf pain racheal LE      Labs:   Recent Results (from the past 24 hour(s))   CBC without differential    Collection Time: 20  7:49 AM   Result Value Ref Range    WBC 13.0 (H) 4.8 - 10.8 K/uL    RBC 2.95 (L) 4.20 - 5.40 M/uL    Hemoglobin 9.7 (L) 12.0 - 16.0 g/dL    Hematocrit 27.5 (L) 37.0 - 47.0 %    MCV 93.2 81.4 - 97.8 fL    MCH 32.9 27.0 - 33.0 pg    MCHC 35.3 (H) 33.6 - 35.0 g/dL    RDW 43.9 35.9 - 50.0 fL    Platelet Count 116 (L) 164 - 446 K/uL    MPV 11.6 9.0 - 12.9 fL       Assessment:  Pod 2  Plan:  Discharge home  Encourage ambulation  Pelvic rest, no heavy lifting/pulling /pushing  F/u in my office 2 weeks for a c/s check if indicated or 6 weeks postpartum  Continue prenatal vitamins daily  Give Rhogam if Rh negative and indicated  Give MMR if indcated prior to discharge  Encourage breastfeeding  Have her remove dressing in 1 week.    Lora Miller M.D.

## 2020-05-28 NOTE — CARE PLAN
Patient discharging.  Problem: Communication  Goal: The ability to communicate needs accurately and effectively will improve  Outcome: MET     Problem: Safety  Goal: Will remain free from injury  Outcome: MET  Goal: Will remain free from falls  Outcome: MET     Problem: Infection  Goal: Will remain free from infection  Outcome: MET     Problem: Venous Thromboembolism (VTW)/Deep Vein Thrombosis (DVT) Prevention:  Goal: Patient will participate in Venous Thrombosis (VTE)/Deep Vein Thrombosis (DVT)Prevention Measures  Outcome: MET     Problem: Bowel/Gastric:  Goal: Normal bowel function is maintained or improved  Outcome: MET  Goal: Will not experience complications related to bowel motility  Outcome: MET     Problem: Knowledge Deficit  Goal: Knowledge of disease process/condition, treatment plan, diagnostic tests, and medications will improve  Outcome: MET  Goal: Knowledge of the prescribed therapeutic regimen will improve  Outcome: MET     Problem: Discharge Barriers/Planning  Goal: Patient's continuum of care needs will be met  Outcome: MET     Problem: Fluid Volume:  Goal: Will maintain balanced intake and output  Outcome: MET     Problem: Altered physiologic condition related to postoperative  delivery  Goal: Patient physiologically stable as evidenced by normal lochia, palpable uterine involution and vital signs within normal limits  Outcome: MET     Problem: Potential for postpartum infection related to surgical incision, compromised uterine condition, urinary tract or respiratory compromise  Goal: Patient will be afebrile and free from signs and symptoms of infection  Outcome: MET     Problem: Alteration in comfort related to surgical incision and/or after birth pains  Goal: Patient is able to ambulate, care for self and infant with acceptable pain level  Outcome: MET  Goal: Patient verbalizes acceptable pain level  Outcome: MET     Problem: Potential knowledge deficit related to lack of understanding  of self and  care  Goal: Patient will verbalize understanding of self and infant care  Outcome: MET  Goal: Patient will demonstrate ability to care for self and infant  Outcome: MET     Problem: Potential anxiety related to difficulty adapting to parental role  Goal: Patient will verbalize and demonstrate effective bonding and parenting behavior  Outcome: MET     Problem: Pain Management  Goal: Pain level will decrease to patient's comfort goal  Outcome: MET

## 2020-05-28 NOTE — DISCHARGE INSTRUCTIONS
POSTPARTUM DISCHARGE INSTRUCTIONS FOR MOM    YOB: 1985   Age: 34 y.o.               Admit Date: 2020     Discharge Date: 2020  Attending Doctor:  Andrea Ye M.D.                  Allergies:  Patient has no known allergies.    Discharged to home by car. Discharged via walking, hospital escort: Yes.  Special equipment needed: Not Applicable  Belongings with: Personal  Be sure to schedule a follow-up appointment with your primary care doctor or any specialists as instructed.     Encourage ambulation   Pelvic rest, no heavy lifting/pulling /pushing   Follow-Up in my office 2 weeks for a  check if indicated or 6 weeks postpartum   Continue prenatal vitamins daily   Encourage breastfeeding    Take dressing off in 1 week     Discharge Plan:   Diet Plan: Discussed  Activity Level: Discussed  Confirmed Follow up Appointment: Patient to Call and Schedule Appointment  Confirmed Symptoms Management: Discussed  Medication Reconciliation Updated: Yes    REASONS TO CALL YOUR OBSTETRICIAN:  1.   Persistent fever or shaking chills (Temperature higher than 100.4)  2.   Heavy bleeding (soaking more than 1 pad per hour); Passing clots  3.   Foul odor from vagina  4.   Mastitis (Breast infection; breast pain, chills, fever, redness)  5.   Urinary pain, burning or frequency  6.   Episiotomy infection  7.   Abdominal incision infection  8.   Severe depression longer than 24 hours    HAND WASHING  · Prior to handling the baby.  · Before breastfeeding or bottle feeding baby.  · After using the bathroom or changing the baby's diaper.    WOUND CARE  Ask your physician for additional care instructions.  In general:    ·  Incision:      · Keep clean and dry.    · Do NOT lift anything heavier than your baby for up to 6 weeks.    · There should not be any opening or pus.      VAGINAL CARE  · Nothing inside vagina for 6 weeks: no sexual intercourse, tampons or douching.  · Bleeding may continue for 2-4  "weeks.  Amount may vary.    · Call your physician for heavy bleeding which means soaking more than 1 pad per hour    BIRTH CONTROL  · It is possible to become pregnant at any time after delivery and while breastfeeding.  · Plan to discuss a method of birth control with your physician at your follow up visit. visit.    DIET AND ELIMINATION  · Eating more fiber (bran cereal, fruits, and vegetables) and drinking plenty of fluids will help to avoid constipation.  · Urinary frequency after childbirth is normal.    POSTPARTUM BLUES  During the first few days after birth, you may experience a sense of the \"blues\" which may include impatience, irritability or even crying.  These feeling come and go quickly.  However, as many as 1 in 10 women experience emotional symptoms known as postpartum depression.    Postpartum depression:  May start as early as the second or third day after delivery or take several weeks or months to develop.  Symptoms of \"blues\" are present, but are more intense:  Crying spells; loss of appetite; feelings of hopelessness or loss of control; fear of touching the baby; over concern or no concern at all about the baby; little or no concern about your own appearance/caring for yourself; and/or inability to sleep or excessive sleeping.  Contact your physician if you are experiencing any of these symptoms.    Crisis Hotline:  · [place] Crisis Hotline:  6-752-DLQTEMM  Or 1-826.729.3215  · [place] Crisis Hotline:  1-796.946.9559  Or 635-943-9064    PREVENTING SHAKEN BABY:  If you are angry or stressed, PUT THE BABY IN THE CRIB, step away, take some deep breaths, and wait until you are calm to care for the baby.  DO NOT SHAKE THE BABY.  You are not alone, call a supporter for help.    · Crisis Call Center 24/7 crisis line 653-702-1035 or 1-894.432.2074  · You can also text them, text \"ANSWER\" to 482147    QUIT SMOKING/TOBACCO USE:  I understand the use of any tobacco products increases my chance of suffering " from future heart disease and could cause other illnesses which may shorten my life. Quitting the use of tobacco products is the single most important thing I can do to improve my health. For further information on smoking / tobacco cessation call a Toll Free Quit Line at 1-597.747.3773 (*National Cancer [place]) or 1-244.749.4156 (American Lung Association) or you can access the web based program at www.lungusa.org.    · [place] Tobacco Users Help Line:  (837) 955-9928       Toll Free: 1-596.257.1946  · Quit Tobacco Program [facility] ([facility] (248)128-7306    DEPRESSION / SUICIDE RISK:  As you are discharged from this [facility], it is important to learn how to keep safe from harming yourself.    Recognize the warning signs:  · Abrupt changes in personality, positive or negative- including increase in energy   · Giving away possessions  · Change in eating patterns- significant weight changes-  positive or negative  · Change in sleeping patterns- unable to sleep or sleeping all the time   · Unwillingness or inability to communicate  · Depression  · Unusual sadness, discouragement and loneliness  · Talk of wanting to die  · Neglect of personal appearance   · Rebelliousness- reckless behavior  · Withdrawal from people/activities they love  · Confusion- inability to concentrate     If you or a loved one observes any of these behaviors or has concerns about self-harm, here's what you can do:  · Talk about it- your feelings and reasons for harming yourself  · Remove any means that you might use to hurt yourself (examples: pills, rope, extension cords, firearm)  · Get professional help from the community (Mental Health, Substance Abuse, psychological counseling)  · Do not be alone:Call your Safe Contact- someone whom you trust who will be there for you.  · Call your local CRISIS HOTLINE 949-2123 or 911-147-0910  · Call your local Children's Mobile Crisis Response Team [facility] (980)  999-7724 or www.[employer]  · Call the toll free National Suicide Prevention Hotlines   · National Suicide Prevention Lifeline 490-783-UOZW (5606)  · National Hope Line Network 800-SUICIDE (868-6739)    DISCHARGE SURVEY:  Thank you for choosing [facility].  We hope we provided you with very good care.  You may be receiving a survey in the mail.  Please fill it out.  Your opinion is valuable to us.    ADDITIONAL EDUCATIONAL MATERIALS GIVEN TO PATIENT:        My signature on this form indicates that:  1.  I have reviewed and understand the above information  2.  My questions regarding this information have been answered to my satisfaction.  3.  I have formulated a plan with my discharge nurse to obtain my prescribed medication for home.

## 2020-05-29 NOTE — DISCHARGE SUMMARY
DATE OF ADMISSION:  2020.    DATE OF DISCHARGE:  2020.    PRINCIPAL DIAGNOSES:  1.  Intrauterine pregnancy at 40 weeks gestation.  2.  In vitro fertilization pregnancy.  3.  Active labor.  4.  Arrest of dilation.    PRINCIPAL PROCEDURES:  1.  Epidural anesthesia.  2.  Primary low transverse  section via Pfannenstiel skin incision.    HOSPITAL COURSE:  The patient arrived in active labor at 40 weeks gestation,   but had a prolonged second stage of labor.  She had been pushing for over an   hour and still the baby was at +1 station.  Baby was found to be direct OP and   attempt at manual rotation was unsuccessful.  The decision was made to   proceed with a primary , which she underwent with Dr. Jurado.  Baby   was found to be in direct OP position.  Male infant, Apgars were 7 and 8 and   weight was 8 pounds 11.9 ounces.  By postoperative day #2,  she is ambulating,   voiding, tolerating regular diet.  Pain is controlled with p.o. pain   medication and she desires to be discharged home.  She will be discharged home   with Motrin and Percocet.  She will follow up with Dr. Ye, her primary   obstetrician, in 1-2 weeks, sooner if needed.  Verbal instructions given to   continue metformin as well as her prenatal vitamins.  Her postoperative   hematocrit was 27.5 and she is O positive.       ____________________________________     MD SHAQ VALLEJO / RODRÍGUEZ    DD:  2020 07:52:57  DT:  2020 00:28:33    D#:  [id number]  Job#:  [id number]

## 2022-01-18 ENCOUNTER — HOSPITAL ENCOUNTER (EMERGENCY)
Facility: MEDICAL CENTER | Age: 37
End: 2022-01-18
Attending: OBSTETRICS & GYNECOLOGY | Admitting: OBSTETRICS & GYNECOLOGY
Payer: COMMERCIAL

## 2022-01-18 VITALS
RESPIRATION RATE: 18 BRPM | TEMPERATURE: 98 F | OXYGEN SATURATION: 96 % | DIASTOLIC BLOOD PRESSURE: 79 MMHG | HEART RATE: 94 BPM | SYSTOLIC BLOOD PRESSURE: 120 MMHG

## 2022-01-18 LAB
ALBUMIN SERPL BCP-MCNC: 3.7 G/DL (ref 3.2–4.9)
ALBUMIN/GLOB SERPL: 1.4 G/DL
ALP SERPL-CCNC: 112 U/L (ref 30–99)
ALT SERPL-CCNC: 13 U/L (ref 2–50)
ANION GAP SERPL CALC-SCNC: 13 MMOL/L (ref 7–16)
APPEARANCE UR: CLEAR
AST SERPL-CCNC: 19 U/L (ref 12–45)
BACTERIA #/AREA URNS HPF: ABNORMAL /HPF
BASOPHILS # BLD AUTO: 0.1 % (ref 0–1.8)
BASOPHILS # BLD: 0.01 K/UL (ref 0–0.12)
BILIRUB SERPL-MCNC: 0.2 MG/DL (ref 0.1–1.5)
BILIRUB UR QL STRIP.AUTO: NEGATIVE
BUN SERPL-MCNC: 9 MG/DL (ref 8–22)
CALCIUM SERPL-MCNC: 8.9 MG/DL (ref 8.5–10.5)
CHLORIDE SERPL-SCNC: 106 MMOL/L (ref 96–112)
CO2 SERPL-SCNC: 19 MMOL/L (ref 20–33)
COLOR UR: YELLOW
CREAT SERPL-MCNC: 0.66 MG/DL (ref 0.5–1.4)
CREAT UR-MCNC: 43.18 MG/DL
EOSINOPHIL # BLD AUTO: 0.04 K/UL (ref 0–0.51)
EOSINOPHIL NFR BLD: 0.5 % (ref 0–6.9)
EPI CELLS #/AREA URNS HPF: ABNORMAL /HPF
ERYTHROCYTE [DISTWIDTH] IN BLOOD BY AUTOMATED COUNT: 44.5 FL (ref 35.9–50)
GLOBULIN SER CALC-MCNC: 2.7 G/DL (ref 1.9–3.5)
GLUCOSE SERPL-MCNC: 107 MG/DL (ref 65–99)
GLUCOSE UR STRIP.AUTO-MCNC: NEGATIVE MG/DL
HCT VFR BLD AUTO: 35.6 % (ref 37–47)
HGB BLD-MCNC: 12.6 G/DL (ref 12–16)
HYALINE CASTS #/AREA URNS LPF: ABNORMAL /LPF
IMM GRANULOCYTES # BLD AUTO: 0.03 K/UL (ref 0–0.11)
IMM GRANULOCYTES NFR BLD AUTO: 0.4 % (ref 0–0.9)
KETONES UR STRIP.AUTO-MCNC: NEGATIVE MG/DL
LEUKOCYTE ESTERASE UR QL STRIP.AUTO: ABNORMAL
LYMPHOCYTES # BLD AUTO: 1.52 K/UL (ref 1–4.8)
LYMPHOCYTES NFR BLD: 19 % (ref 22–41)
MCH RBC QN AUTO: 32.1 PG (ref 27–33)
MCHC RBC AUTO-ENTMCNC: 35.4 G/DL (ref 33.6–35)
MCV RBC AUTO: 90.6 FL (ref 81.4–97.8)
MICRO URNS: ABNORMAL
MONOCYTES # BLD AUTO: 0.65 K/UL (ref 0–0.85)
MONOCYTES NFR BLD AUTO: 8.1 % (ref 0–13.4)
NEUTROPHILS # BLD AUTO: 5.77 K/UL (ref 2–7.15)
NEUTROPHILS NFR BLD: 71.9 % (ref 44–72)
NITRITE UR QL STRIP.AUTO: NEGATIVE
NRBC # BLD AUTO: 0 K/UL
NRBC BLD-RTO: 0 /100 WBC
PH UR STRIP.AUTO: 6 [PH] (ref 5–8)
PLATELET # BLD AUTO: 197 K/UL (ref 164–446)
PMV BLD AUTO: 11.2 FL (ref 9–12.9)
POTASSIUM SERPL-SCNC: 3.9 MMOL/L (ref 3.6–5.5)
PROT SERPL-MCNC: 6.4 G/DL (ref 6–8.2)
PROT UR QL STRIP: NEGATIVE MG/DL
PROT UR-MCNC: 5 MG/DL (ref 0–15)
PROT/CREAT UR: 116 MG/G (ref 10–107)
RBC # BLD AUTO: 3.93 M/UL (ref 4.2–5.4)
RBC # URNS HPF: ABNORMAL /HPF
RBC UR QL AUTO: NEGATIVE
SODIUM SERPL-SCNC: 138 MMOL/L (ref 135–145)
SP GR UR STRIP.AUTO: 1.01
UROBILINOGEN UR STRIP.AUTO-MCNC: 0.2 MG/DL
WBC # BLD AUTO: 8 K/UL (ref 4.8–10.8)
WBC #/AREA URNS HPF: ABNORMAL /HPF

## 2022-01-18 PROCEDURE — 82570 ASSAY OF URINE CREATININE: CPT

## 2022-01-18 PROCEDURE — 59025 FETAL NON-STRESS TEST: CPT

## 2022-01-18 PROCEDURE — 302449 STATCHG TRIAGE ONLY (STATISTIC)

## 2022-01-18 PROCEDURE — 81001 URINALYSIS AUTO W/SCOPE: CPT

## 2022-01-18 PROCEDURE — 84156 ASSAY OF PROTEIN URINE: CPT

## 2022-01-18 PROCEDURE — 85025 COMPLETE CBC W/AUTO DIFF WBC: CPT

## 2022-01-18 PROCEDURE — 80053 COMPREHEN METABOLIC PANEL: CPT

## 2022-01-18 PROCEDURE — 36415 COLL VENOUS BLD VENIPUNCTURE: CPT

## 2022-01-18 NOTE — PROGRESS NOTES
38.2 +FM. Denies LOF, VB or UC's    PT sent from Dr Ye for RO PIH.     1515 Call to Teja Pickard took call results discussed, PT to FU in office next week.     PT to return for decreased FM, LOF, VB or UC's, PIH S/S verbalized.

## 2022-01-26 ENCOUNTER — PRE-ADMISSION TESTING (OUTPATIENT)
Dept: ADMISSIONS | Facility: MEDICAL CENTER | Age: 37
End: 2022-01-26
Attending: OBSTETRICS & GYNECOLOGY
Payer: COMMERCIAL

## 2022-01-27 NOTE — H&P
DATE OF ADMISSION:  2022     IDENTIFICATION:  This is a 36-year-old  3, para 2-0-0-2 with an EDC of   2022 and EGA of 40 and 3/7 weeks, who presents with a chief complaint of   previous .     HISTORY OF PRESENT ILLNESS:  This is a patient of mine who has gotten good   prenatal care.  Prenatal care has been uncomplicated.  She has a previous   vaginal delivery followed by  for fetal intolerance to labor.  She   had been planning on a TOLAC, but today presents for repeat low transverse   .  She has no other complaints, nausea, vomiting, fever, chills,   change in bowel or bladder habits.  She admits good fetal movement.  Denies   symptoms of PIH, headaches, visual changes, epigastric pain, nondependent   edema.  Her beta Strep is negative.  She does have PCOS, stable on metformin   and she had normal Glucola with this pregnancy.  After careful counseling, she   would like to move forward with repeat  at this time.     OBSTETRICAL HISTORY:  Significant for previous vaginal delivery, previous   .     GYNECOLOGIC HISTORY:  History of infertility, history of PCOS.     MEDICAL HISTORY:  ALLERGIES:  None.     CURRENT MEDICATIONS:  Metformin, prenatal vitamins.     MEDICAL PROBLEMS:  PCOS.  She has had the COVID vaccine and booster.     SURGICAL HISTORY:  Previous  section.     SOCIAL HISTORY:  Denies alcohol, tobacco or drug abuse.     FAMILY HISTORY:  Noncontributory.     REVIEW OF SYSTEMS:  Times 12 is negative per AMA standards available in chart.     LABORATORY DATA:  She is Rh positive, rubella immune.  Her beta Strep is   negative.  She had normal one-hour.  Her cell-free DNA was 46,XX.  AFP is   normal Glucola was normal.     PHYSICAL EXAMINATION:  VITAL SIGNS:  The patient is afebrile.  Vital signs within normal limits.    Fetal heart tracings anticipated reactive, category 1.  She is not   jasmeet.  GENERAL:  She is awake, alert, in no  apparent distress.  NECK:  Supple.  HEART:  Regular.  CHEST:  Clear.  BREASTS:  Symmetrical.  ABDOMEN:  Soft, gravid, size appropriate for dates.  EXTREMITIES:  Negative.  GYNECOLOGIC:  EGBUS within normal limits.  No perineal lesions.  Vagina is   pink and moist.  Cervix is 1, 50, -2.  Uterus midline, anteverted, size   appropriate dates.  No adnexal masses.     ASSESSMENT:  At this time:  1.  Pregnancy at 40 and 3/7 weeks.  2.  Previous , desires repeat at this time.  3.  Beta Strep negative.     PLAN:  At this time the patient has been extensively counseled on risks,   benefits, complications and alternatives to surgery, which include, but are   not limited to risk of anesthesia, risk of injury to bowel, bladder, ureters,   major vessels, nerves, pelvis, risk of blood clots in her legs and lungs and   subsequent postop pneumonia.  She accepts these risks and the plan   subsequently is repeat low transverse .        ______________________________  MD FAIZAN Sanchez/GIULIANO/EDUARDO    DD:  2022 15:36  DT:  2022 16:14    Job#:  [id number]

## 2022-01-30 ENCOUNTER — ANESTHESIA EVENT (OUTPATIENT)
Dept: OBGYN | Facility: MEDICAL CENTER | Age: 37
End: 2022-01-30
Payer: COMMERCIAL

## 2022-01-30 ENCOUNTER — APPOINTMENT (OUTPATIENT)
Dept: OBGYN | Facility: MEDICAL CENTER | Age: 37
End: 2022-01-30
Attending: OBSTETRICS & GYNECOLOGY
Payer: COMMERCIAL

## 2022-01-30 ENCOUNTER — HOSPITAL ENCOUNTER (INPATIENT)
Facility: MEDICAL CENTER | Age: 37
LOS: 1 days | End: 2022-01-31
Attending: OBSTETRICS & GYNECOLOGY | Admitting: OBSTETRICS & GYNECOLOGY
Payer: COMMERCIAL

## 2022-01-30 ENCOUNTER — ANESTHESIA (OUTPATIENT)
Dept: OBGYN | Facility: MEDICAL CENTER | Age: 37
End: 2022-01-30
Payer: COMMERCIAL

## 2022-01-30 DIAGNOSIS — R10.2 POSTPARTUM PERINEAL PAIN: ICD-10-CM

## 2022-01-30 LAB
ALBUMIN SERPL BCP-MCNC: 3.9 G/DL (ref 3.2–4.9)
ALBUMIN/GLOB SERPL: 1.3 G/DL
ALP SERPL-CCNC: 136 U/L (ref 30–99)
ALT SERPL-CCNC: 13 U/L (ref 2–50)
ANION GAP SERPL CALC-SCNC: 15 MMOL/L (ref 7–16)
AST SERPL-CCNC: 18 U/L (ref 12–45)
BASOPHILS # BLD AUTO: 0.2 % (ref 0–1.8)
BASOPHILS # BLD: 0.02 K/UL (ref 0–0.12)
BILIRUB SERPL-MCNC: 0.2 MG/DL (ref 0.1–1.5)
BUN SERPL-MCNC: 12 MG/DL (ref 8–22)
CALCIUM SERPL-MCNC: 9.8 MG/DL (ref 8.5–10.5)
CHLORIDE SERPL-SCNC: 104 MMOL/L (ref 96–112)
CO2 SERPL-SCNC: 17 MMOL/L (ref 20–33)
CREAT SERPL-MCNC: 0.66 MG/DL (ref 0.5–1.4)
EOSINOPHIL # BLD AUTO: 0.03 K/UL (ref 0–0.51)
EOSINOPHIL NFR BLD: 0.3 % (ref 0–6.9)
ERYTHROCYTE [DISTWIDTH] IN BLOOD BY AUTOMATED COUNT: 45.8 FL (ref 35.9–50)
GLOBULIN SER CALC-MCNC: 3 G/DL (ref 1.9–3.5)
GLUCOSE SERPL-MCNC: 87 MG/DL (ref 65–99)
HCT VFR BLD AUTO: 38.1 % (ref 37–47)
HGB BLD-MCNC: 13.2 G/DL (ref 12–16)
HOLDING TUBE BB 8507: NORMAL
IMM GRANULOCYTES # BLD AUTO: 0.03 K/UL (ref 0–0.11)
IMM GRANULOCYTES NFR BLD AUTO: 0.3 % (ref 0–0.9)
LYMPHOCYTES # BLD AUTO: 1.55 K/UL (ref 1–4.8)
LYMPHOCYTES NFR BLD: 16.4 % (ref 22–41)
MCH RBC QN AUTO: 31.4 PG (ref 27–33)
MCHC RBC AUTO-ENTMCNC: 34.6 G/DL (ref 33.6–35)
MCV RBC AUTO: 90.7 FL (ref 81.4–97.8)
MONOCYTES # BLD AUTO: 0.58 K/UL (ref 0–0.85)
MONOCYTES NFR BLD AUTO: 6.1 % (ref 0–13.4)
NEUTROPHILS # BLD AUTO: 7.25 K/UL (ref 2–7.15)
NEUTROPHILS NFR BLD: 76.7 % (ref 44–72)
NRBC # BLD AUTO: 0 K/UL
NRBC BLD-RTO: 0 /100 WBC
PLATELET # BLD AUTO: 208 K/UL (ref 164–446)
PMV BLD AUTO: 11.6 FL (ref 9–12.9)
POTASSIUM SERPL-SCNC: 3.7 MMOL/L (ref 3.6–5.5)
PROT SERPL-MCNC: 6.9 G/DL (ref 6–8.2)
RBC # BLD AUTO: 4.2 M/UL (ref 4.2–5.4)
SARS-COV+SARS-COV-2 AG RESP QL IA.RAPID: NOTDETECTED
SODIUM SERPL-SCNC: 136 MMOL/L (ref 135–145)
SPECIMEN SOURCE: NORMAL
WBC # BLD AUTO: 9.5 K/UL (ref 4.8–10.8)

## 2022-01-30 PROCEDURE — 0UQKXZZ REPAIR HYMEN, EXTERNAL APPROACH: ICD-10-PCS | Performed by: OBSTETRICS & GYNECOLOGY

## 2022-01-30 PROCEDURE — 59409 OBSTETRICAL CARE: CPT

## 2022-01-30 PROCEDURE — 10H073Z INSERTION OF MONITORING ELECTRODE INTO PRODUCTS OF CONCEPTION, VIA NATURAL OR ARTIFICIAL OPENING: ICD-10-PCS | Performed by: OBSTETRICS & GYNECOLOGY

## 2022-01-30 PROCEDURE — 700105 HCHG RX REV CODE 258: Performed by: OBSTETRICS & GYNECOLOGY

## 2022-01-30 PROCEDURE — 770002 HCHG ROOM/CARE - OB PRIVATE (112)

## 2022-01-30 PROCEDURE — 87426 SARSCOV CORONAVIRUS AG IA: CPT

## 2022-01-30 PROCEDURE — 700111 HCHG RX REV CODE 636 W/ 250 OVERRIDE (IP): Performed by: OBSTETRICS & GYNECOLOGY

## 2022-01-30 PROCEDURE — 304965 HCHG RECOVERY SERVICES

## 2022-01-30 PROCEDURE — 80053 COMPREHEN METABOLIC PANEL: CPT

## 2022-01-30 PROCEDURE — 36415 COLL VENOUS BLD VENIPUNCTURE: CPT

## 2022-01-30 PROCEDURE — 10907ZC DRAINAGE OF AMNIOTIC FLUID, THERAPEUTIC FROM PRODUCTS OF CONCEPTION, VIA NATURAL OR ARTIFICIAL OPENING: ICD-10-PCS | Performed by: OBSTETRICS & GYNECOLOGY

## 2022-01-30 PROCEDURE — 303615 HCHG EPIDURAL/SPINAL ANESTHESIA FOR LABOR

## 2022-01-30 PROCEDURE — 85025 COMPLETE CBC W/AUTO DIFF WBC: CPT

## 2022-01-30 PROCEDURE — 700111 HCHG RX REV CODE 636 W/ 250 OVERRIDE (IP): Performed by: ANESTHESIOLOGY

## 2022-01-30 PROCEDURE — 700101 HCHG RX REV CODE 250: Performed by: ANESTHESIOLOGY

## 2022-01-30 PROCEDURE — 4A1H7CZ MONITORING OF PRODUCTS OF CONCEPTION, CARDIAC RATE, VIA NATURAL OR ARTIFICIAL OPENING: ICD-10-PCS | Performed by: OBSTETRICS & GYNECOLOGY

## 2022-01-30 RX ORDER — VITAMIN A ACETATE, BETA CAROTENE, ASCORBIC ACID, CHOLECALCIFEROL, .ALPHA.-TOCOPHEROL ACETATE, DL-, THIAMINE MONONITRATE, RIBOFLAVIN, NIACINAMIDE, PYRIDOXINE HYDROCHLORIDE, FOLIC ACID, CYANOCOBALAMIN, CALCIUM CARBONATE, FERROUS FUMARATE, ZINC OXIDE, CUPRIC OXIDE 3080; 12; 120; 400; 1; 1.84; 3; 20; 22; 920; 25; 200; 27; 10; 2 [IU]/1; UG/1; MG/1; [IU]/1; MG/1; MG/1; MG/1; MG/1; MG/1; [IU]/1; MG/1; MG/1; MG/1; MG/1; MG/1
1 TABLET, FILM COATED ORAL
Status: DISCONTINUED | OUTPATIENT
Start: 2022-01-31 | End: 2022-01-31 | Stop reason: HOSPADM

## 2022-01-30 RX ORDER — CARBOPROST TROMETHAMINE 250 UG/ML
250 INJECTION, SOLUTION INTRAMUSCULAR
Status: DISCONTINUED | OUTPATIENT
Start: 2022-01-30 | End: 2022-01-30 | Stop reason: HOSPADM

## 2022-01-30 RX ORDER — METHYLERGONOVINE MALEATE 0.2 MG/ML
0.2 INJECTION INTRAVENOUS
Status: DISCONTINUED | OUTPATIENT
Start: 2022-01-30 | End: 2022-01-30 | Stop reason: HOSPADM

## 2022-01-30 RX ORDER — SODIUM CHLORIDE, SODIUM LACTATE, POTASSIUM CHLORIDE, CALCIUM CHLORIDE 600; 310; 30; 20 MG/100ML; MG/100ML; MG/100ML; MG/100ML
INJECTION, SOLUTION INTRAVENOUS CONTINUOUS
Status: DISCONTINUED | OUTPATIENT
Start: 2022-01-30 | End: 2022-01-30

## 2022-01-30 RX ORDER — ACETAMINOPHEN 500 MG
1000 TABLET ORAL EVERY 6 HOURS PRN
Status: DISCONTINUED | OUTPATIENT
Start: 2022-01-30 | End: 2022-01-31 | Stop reason: HOSPADM

## 2022-01-30 RX ORDER — DEXTROSE, SODIUM CHLORIDE, SODIUM LACTATE, POTASSIUM CHLORIDE, AND CALCIUM CHLORIDE 5; .6; .31; .03; .02 G/100ML; G/100ML; G/100ML; G/100ML; G/100ML
INJECTION, SOLUTION INTRAVENOUS CONTINUOUS
Status: DISCONTINUED | OUTPATIENT
Start: 2022-01-30 | End: 2022-01-30

## 2022-01-30 RX ORDER — IBUPROFEN 800 MG/1
800 TABLET ORAL 3 TIMES DAILY PRN
Status: DISCONTINUED | OUTPATIENT
Start: 2022-01-30 | End: 2022-01-31 | Stop reason: HOSPADM

## 2022-01-30 RX ORDER — SODIUM CHLORIDE, SODIUM LACTATE, POTASSIUM CHLORIDE, AND CALCIUM CHLORIDE .6; .31; .03; .02 G/100ML; G/100ML; G/100ML; G/100ML
250 INJECTION, SOLUTION INTRAVENOUS PRN
Status: DISCONTINUED | OUTPATIENT
Start: 2022-01-30 | End: 2022-01-30 | Stop reason: HOSPADM

## 2022-01-30 RX ORDER — SODIUM CHLORIDE, SODIUM LACTATE, POTASSIUM CHLORIDE, CALCIUM CHLORIDE 600; 310; 30; 20 MG/100ML; MG/100ML; MG/100ML; MG/100ML
INJECTION, SOLUTION INTRAVENOUS PRN
Status: DISCONTINUED | OUTPATIENT
Start: 2022-01-30 | End: 2022-01-31 | Stop reason: HOSPADM

## 2022-01-30 RX ORDER — LIDOCAINE HYDROCHLORIDE AND EPINEPHRINE 15; 5 MG/ML; UG/ML
INJECTION, SOLUTION EPIDURAL
Status: COMPLETED | OUTPATIENT
Start: 2022-01-30 | End: 2022-01-30

## 2022-01-30 RX ORDER — ROPIVACAINE HYDROCHLORIDE 2 MG/ML
INJECTION, SOLUTION EPIDURAL; INFILTRATION; PERINEURAL CONTINUOUS
Status: DISCONTINUED | OUTPATIENT
Start: 2022-01-30 | End: 2022-01-30

## 2022-01-30 RX ORDER — MISOPROSTOL 200 UG/1
800 TABLET ORAL
Status: DISCONTINUED | OUTPATIENT
Start: 2022-01-30 | End: 2022-01-30 | Stop reason: HOSPADM

## 2022-01-30 RX ORDER — SODIUM CHLORIDE, SODIUM LACTATE, POTASSIUM CHLORIDE, AND CALCIUM CHLORIDE .6; .31; .03; .02 G/100ML; G/100ML; G/100ML; G/100ML
1000 INJECTION, SOLUTION INTRAVENOUS
Status: DISCONTINUED | OUTPATIENT
Start: 2022-01-30 | End: 2022-01-30 | Stop reason: HOSPADM

## 2022-01-30 RX ORDER — TERBUTALINE SULFATE 1 MG/ML
0.25 INJECTION, SOLUTION SUBCUTANEOUS
Status: DISCONTINUED | OUTPATIENT
Start: 2022-01-30 | End: 2022-01-30 | Stop reason: HOSPADM

## 2022-01-30 RX ORDER — DOCUSATE SODIUM 100 MG/1
100 CAPSULE, LIQUID FILLED ORAL 2 TIMES DAILY PRN
Status: DISCONTINUED | OUTPATIENT
Start: 2022-01-30 | End: 2022-01-31 | Stop reason: HOSPADM

## 2022-01-30 RX ORDER — MISOPROSTOL 200 UG/1
600 TABLET ORAL
Status: DISCONTINUED | OUTPATIENT
Start: 2022-01-30 | End: 2022-01-31 | Stop reason: HOSPADM

## 2022-01-30 RX ORDER — OXYTOCIN 10 [USP'U]/ML
10 INJECTION, SOLUTION INTRAMUSCULAR; INTRAVENOUS
Status: DISCONTINUED | OUTPATIENT
Start: 2022-01-30 | End: 2022-01-30 | Stop reason: HOSPADM

## 2022-01-30 RX ORDER — SODIUM CHLORIDE, SODIUM LACTATE, POTASSIUM CHLORIDE, CALCIUM CHLORIDE 600; 310; 30; 20 MG/100ML; MG/100ML; MG/100ML; MG/100ML
1000 INJECTION, SOLUTION INTRAVENOUS CONTINUOUS
Status: DISCONTINUED | OUTPATIENT
Start: 2022-01-30 | End: 2022-01-30

## 2022-01-30 RX ADMIN — OXYTOCIN 2 MILLI-UNITS/MIN: 10 INJECTION, SOLUTION INTRAMUSCULAR; INTRAVENOUS at 15:47

## 2022-01-30 RX ADMIN — OXYTOCIN 125 ML/HR: 10 INJECTION, SOLUTION INTRAMUSCULAR; INTRAVENOUS at 21:09

## 2022-01-30 RX ADMIN — ROPIVACAINE HYDROCHLORIDE: 2 INJECTION, SOLUTION EPIDURAL; INFILTRATION at 17:54

## 2022-01-30 RX ADMIN — BUPIVACAINE HYDROCHLORIDE 8 ML: 2.5 INJECTION, SOLUTION EPIDURAL; INFILTRATION; INTRACAUDAL; PERINEURAL at 17:46

## 2022-01-30 RX ADMIN — SODIUM CHLORIDE, POTASSIUM CHLORIDE, SODIUM LACTATE AND CALCIUM CHLORIDE: 600; 310; 30; 20 INJECTION, SOLUTION INTRAVENOUS at 15:46

## 2022-01-30 RX ADMIN — LIDOCAINE HYDROCHLORIDE,EPINEPHRINE BITARTRATE 3 ML: 15; .005 INJECTION, SOLUTION EPIDURAL; INFILTRATION; INTRACAUDAL; PERINEURAL at 17:46

## 2022-01-30 RX ADMIN — FENTANYL CITRATE 100 MCG: 50 INJECTION, SOLUTION INTRAMUSCULAR; INTRAVENOUS at 17:18

## 2022-01-30 RX ADMIN — SODIUM CHLORIDE, POTASSIUM CHLORIDE, SODIUM LACTATE AND CALCIUM CHLORIDE: 600; 310; 30; 20 INJECTION, SOLUTION INTRAVENOUS at 17:57

## 2022-01-30 ASSESSMENT — LIFESTYLE VARIABLES
CONSUMPTION TOTAL: INCOMPLETE
TOTAL SCORE: 0
DOES PATIENT WANT TO STOP DRINKING: NO
TOTAL SCORE: 0
ALCOHOL_USE: NO
HAVE YOU EVER FELT YOU SHOULD CUT DOWN ON YOUR DRINKING: NO
TOTAL SCORE: 0
EVER HAD A DRINK FIRST THING IN THE MORNING TO STEADY YOUR NERVES TO GET RID OF A HANGOVER: NO
EVER_SMOKED: NEVER
EVER FELT BAD OR GUILTY ABOUT YOUR DRINKING: NO
HAVE PEOPLE ANNOYED YOU BY CRITICIZING YOUR DRINKING: NO

## 2022-01-30 ASSESSMENT — PAIN DESCRIPTION - PAIN TYPE
TYPE: ACUTE PAIN

## 2022-01-30 ASSESSMENT — PATIENT HEALTH QUESTIONNAIRE - PHQ9
SUM OF ALL RESPONSES TO PHQ9 QUESTIONS 1 AND 2: 0
1. LITTLE INTEREST OR PLEASURE IN DOING THINGS: NOT AT ALL
2. FEELING DOWN, DEPRESSED, IRRITABLE, OR HOPELESS: NOT AT ALL

## 2022-01-30 ASSESSMENT — FIBROSIS 4 INDEX
FIB4 SCORE: 0.94
FIB4 SCORE: 0.94

## 2022-01-30 NOTE — H&P
DATE OF ADMISSION:  2022     PATIENT IDENTIFICATION:  A 36-year-old  3, para 2-0-2-2, at 40 weeks   and 0 days by ultrasound at 9+1 weeks, [facility] 2022.     CHIEF COMPLAINT:  Spontaneous rupture of membranes and contractions.     HISTORY OF PRESENT ILLNESS:  The patient has prenatal care with Dr. Ye.    Her pregnancy has been complicated by polycystic ovarian syndrome as well as a   prior  section x1.  The patient is advanced maternal age.  She has a   history of 1 term normal spontaneous vaginal delivery followed by a primary    section due to failure to descend.  The patient pushed for 1.5 hours   and made minimal fetal descent and the fetus was noted to be in the occiput   posterior position.  The patient has desired a trial of labor after    section.  She reports spontaneous rupture of membranes today at approximately   3:30 a.m.  She has also been having contractions since 4:00 a.m.  She denies   vaginal bleeding.  She endorses positive fetal movement.     REVIEW OF SYSTEMS:  Denies fevers, chills, shortness of breath, chest pain,   nausea, vomiting, diarrhea or dysuria.     PAST MEDICAL HISTORY:  Denies.     PAST SURGICAL HISTORY:   section x1 in .     MEDICATIONS:  Prenatal vitamins, metformin 500 mg b.i.d.     FAMILY HISTORY:  Paternal grandfather with prostate cancer.  Maternal   grandfather with colon cancer.  Maternal grandmother with stroke.     SOCIAL HISTORY:  Denies tobacco use, alcohol use, or drug use.     GYNECOLOGIC HISTORY:  Last menstrual period 04/15/2021.  Denies history of   sexually transmitted infections or genital herpes.     OBSTETRICAL HISTORY:  G1, term normal spontaneous vaginal delivery in 2018.    Weight 6 pounds female, no complications.  G2, term primary  section   due to failure to descend male, weight 8 pounds 11.9 ounces.  G3, current   prenatal care with Dr. Ye.     ALLERGIES:  No known drug allergies.      PHYSICAL EXAMINATION:  VITAL SIGNS:  Temperature 98.1, pulse 80, respiratory rate 18, blood pressure   120/81.  GENERAL:  Alert, conversational, pleasant, no acute distress.  HEENT:  Moist mucous membranes.  CARDIOVASCULAR:  Regular rate.  PULMONARY:  No respiratory distress. Symmetric expansion.  ABDOMEN:  Soft, nontender, nondistended, gravid.  GENITOURINARY:  A 4cm, 70% effaced, -2 fetal station per RN, no evidence of   gross rupture of membranes.  EXTREMITIES:  Moves all, no edema.    NST baseline 140s, moderate variability, no decelerations, positive accelerations.      LABORATORY STUDIES:  Prenatal labs reviewed, O positive, antibody negative,   HIV nonreactive, rubella immune, RPR nonreactive, hepatitis C antibody   negative, hepatitis B surface antigen negative.  Gonorrhea and chlamydia   negative.  Urine culture, mixed urogenital jeffry.  MSAFP negative.  One-hour   glucose tolerance test 106, GBS negative.  NIPT testing, low risk female.     IMAGING:  Anatomy ultrasound report shows a single live intrauterine pregnancy   with normal anatomy.  Growth ultrasound at 34 weeks showed an estimated fetal   weight of 5 pounds 12 ounces (69th percentile) left lateral fundal placenta,   no placenta previa.  NST baseline 140s, moderate variability, positive   accelerations, no decelerations.  Tocometer regular contractions every 3   minutes.     ASSESSMENT:  A 36-year-old  3, para 2-0-2-2, at 40 weeks and 0 days by   ultrasound, [facility] 2022.    1.  Term intrauterine pregnancy at 40 weeks and 0 days.  2.  History of  section x1.  3.  Desires trial of labor after  section.  4.  Active labor.     DISCUSSION:  The patient desires trial of labor after  section.  She   has a history of 1 term normal spontaneous vaginal delivery and underwent a   primary  section due to failure to descend due to persistent occiput   posterior presentation and a larger fetus.  She was made aware of  the 0.5-0.8%   risk of uterine rupture as well as the risks associated with a uterine   rupture.  She has verbalized an understanding and wishes to proceed with a trial   of labor after  section.     PLAN:  1.  Admit to L and D.  2.  CBC, type and screen.  3.  Continuous fetal heart tracing/tocometer.  4.  IV fluids 125 mL an hour.  5.  IV fentanyl versus epidural for pain.  6.  Expectant management.        ______________________________  MD ADRIA Serrato/MARK    DD:  2022 13:32  DT:  2022 14:05    Job#:  [id number]

## 2022-01-30 NOTE — PROGRESS NOTES
1155- pt to triage room 1 c/o uc since 0330 and lof since 0400 am today. Pt denies uc,bleeding and states +fm. Audible fht 130. Pt states she is scheduled for c/s on Wednesday but was told by dr hernandez if she goes into labor before then she may tolac. Pt had c/s with chile #2 due to failure to progress. Pt wishes to tolac if admitted. Will do sve and call dr de anda    1210- dr de anda called and admission orders rec'd    1230- report given to anamaria kurtz

## 2022-01-30 NOTE — PROGRESS NOTES
s/p AROM of fore-bag with clear fluid noted. Reports large gush of fluid at 3:30am and leaking after that but no leaking since getting to the hospital. IUPC placed. SVE 4/70/-2. Cephalic. FHT Cat 1. Discussed the risks associated with TOLAC and patient verbalized an understanding. She reports this baby feels much smaller than her last and she strongly desires TOLAC. Will monitor CXTs and discussed possible need for Pitocin to augment labor. She is in agreement with the plan. She does not know yet if she wants an epidural.     NOTE: Patient with 65% chance of successful TOLAC given age, BMI and prior C/S due to arrest. Jeanette Adelso Mateus calculator used.     Liberty Cunningham M.D.

## 2022-01-30 NOTE — PROGRESS NOTES
"1300 Received report from Elida MCCONNELL RN  Patient admitted from triage by Dr. Cunningham    Patient is  with a prior  presenting for contractions and possible rupture. Patient is attempting TOLAC and is aware of the risks and concerns.    1420 Dr. Cunningham at bedside  /-2  AROM - clear  IUPC placed    1515 Text from Dr. Cunningham to start pitocin 2x2q30    1700 In patients room to assess  Reeducated on epidural and the patients fear that the epidural could \"slow the progression of her labor\"  Requested epidural  Opened fluids  Text anesthesia to verify orders and availability to place epidural  Discussed coverage for pain while waiting for epidural - patient agreed to coverage medication. See MAR    Breaks in tracing of FHR during contractions is patient standing and bending over bed during contraction.    1745 Dr. Ramsey at bedside   Epidural placed    1815 /0  Reyes placed    1830 Text Dr. Cunningham - status update plus review FHR  Dr. Cunningham is good with where we are and letting her labor down a little while    1900 Report given to Valery PELLETIER Rn - POC discussed                  "

## 2022-01-31 ENCOUNTER — PHARMACY VISIT (OUTPATIENT)
Dept: PHARMACY | Facility: MEDICAL CENTER | Age: 37
End: 2022-01-31
Payer: COMMERCIAL

## 2022-01-31 VITALS
OXYGEN SATURATION: 96 % | HEIGHT: 63 IN | BODY MASS INDEX: 30.12 KG/M2 | SYSTOLIC BLOOD PRESSURE: 130 MMHG | DIASTOLIC BLOOD PRESSURE: 86 MMHG | TEMPERATURE: 98.7 F | WEIGHT: 170 LBS | HEART RATE: 100 BPM | RESPIRATION RATE: 20 BRPM

## 2022-01-31 LAB
ERYTHROCYTE [DISTWIDTH] IN BLOOD BY AUTOMATED COUNT: 43.7 FL (ref 35.9–50)
HCT VFR BLD AUTO: 31.1 % (ref 37–47)
HGB BLD-MCNC: 11.2 G/DL (ref 12–16)
MCH RBC QN AUTO: 32.3 PG (ref 27–33)
MCHC RBC AUTO-ENTMCNC: 36 G/DL (ref 33.6–35)
MCV RBC AUTO: 89.6 FL (ref 81.4–97.8)
PLATELET # BLD AUTO: 154 K/UL (ref 164–446)
PMV BLD AUTO: 11.4 FL (ref 9–12.9)
RBC # BLD AUTO: 3.47 M/UL (ref 4.2–5.4)
WBC # BLD AUTO: 12.9 K/UL (ref 4.8–10.8)

## 2022-01-31 PROCEDURE — 700102 HCHG RX REV CODE 250 W/ 637 OVERRIDE(OP): Performed by: OBSTETRICS & GYNECOLOGY

## 2022-01-31 PROCEDURE — RXMED WILLOW AMBULATORY MEDICATION CHARGE: Performed by: OBSTETRICS & GYNECOLOGY

## 2022-01-31 PROCEDURE — A9270 NON-COVERED ITEM OR SERVICE: HCPCS | Performed by: OBSTETRICS & GYNECOLOGY

## 2022-01-31 PROCEDURE — 36415 COLL VENOUS BLD VENIPUNCTURE: CPT

## 2022-01-31 PROCEDURE — 85027 COMPLETE CBC AUTOMATED: CPT

## 2022-01-31 RX ORDER — IBUPROFEN 600 MG/1
600 TABLET ORAL EVERY 6 HOURS PRN
Qty: 28 TABLET | Refills: 0 | Status: SHIPPED | OUTPATIENT
Start: 2022-01-31 | End: 2022-02-07

## 2022-01-31 RX ADMIN — ACETAMINOPHEN 1000 MG: 500 TABLET ORAL at 05:48

## 2022-01-31 RX ADMIN — IBUPROFEN 800 MG: 800 TABLET, FILM COATED ORAL at 00:01

## 2022-01-31 RX ADMIN — IBUPROFEN 800 MG: 800 TABLET, FILM COATED ORAL at 10:19

## 2022-01-31 RX ADMIN — VITAMIN A, VITAMIN C, VITAMIN D, VITAMIN E, THIAMINE, RIBOFLAVIN, NIACIN, VITAMIN B6, FOLIC ACID, VITAMIN B12, CALCIUM, IRON, ZINC, COPPER: 4000; 120; 400; 22; 1.84; 3; 20; 10; 1; 12; 200; 27; 25; 2 TABLET ORAL at 10:01

## 2022-01-31 RX ADMIN — DOCUSATE SODIUM 100 MG: 100 CAPSULE ORAL at 10:01

## 2022-01-31 ASSESSMENT — PAIN SCALES - GENERAL: PAIN_LEVEL: 2

## 2022-01-31 ASSESSMENT — EDINBURGH POSTNATAL DEPRESSION SCALE (EPDS)
I HAVE LOOKED FORWARD WITH ENJOYMENT TO THINGS: AS MUCH AS I EVER DID
I HAVE BLAMED MYSELF UNNECESSARILY WHEN THINGS WENT WRONG: NOT VERY OFTEN
I HAVE BEEN ABLE TO LAUGH AND SEE THE FUNNY SIDE OF THINGS: AS MUCH AS I ALWAYS COULD
I HAVE BEEN SO UNHAPPY THAT I HAVE HAD DIFFICULTY SLEEPING: NOT AT ALL
I HAVE BEEN ANXIOUS OR WORRIED FOR NO GOOD REASON: HARDLY EVER
THE THOUGHT OF HARMING MYSELF HAS OCCURRED TO ME: NEVER
I HAVE FELT SCARED OR PANICKY FOR NO GOOD REASON: NO, NOT AT ALL
I HAVE BEEN SO UNHAPPY THAT I HAVE BEEN CRYING: NO, NEVER
I HAVE FELT SAD OR MISERABLE: NO, NOT AT ALL
THINGS HAVE BEEN GETTING ON TOP OF ME: NO, MOST OF THE TIME I HAVE COPED QUITE WELL

## 2022-01-31 ASSESSMENT — PAIN DESCRIPTION - PAIN TYPE: TYPE: ACUTE PAIN

## 2022-01-31 NOTE — DISCHARGE PLANNING
Meds-to-Beds: Discharge prescription order listed below delivered to patient's bedside. DHEERAJ Busby notified. Patient counseled. Patient elected to have co-payment billed to patient account.         Current Outpatient Medications   Medication Sig Dispense Refill   • ibuprofen (MOTRIN) 600 MG Tab Take 1 Tablet by mouth every 6 hours as needed for up to 7 days. 28 Tablet 0      Tresa Muñoz, PharmD

## 2022-01-31 NOTE — L&D DELIVERY NOTE
Date: 2022    PREOPERATIVE DIAGNOSIS:   1.  Term intrauterine pregnancy at 40 weeks and 0 days.  2.  History of  section x1.  3.  Desires trial of labor after  section.  4.  Active labor.    POSTOPERATIVE DIAGNOSIS: same as above    PROCEDURE: Vaginal birth after  section.    Primary OB/GYN: Andrea Ye M.D.     Delivering Physician: Liberty Cunningham M.D.     Anesthesia: Epidural.    Complications: None    Estimated blood loss: 100 ml    PROCEDURE DETAILS:   36 y.o.  presented to [facility] with a chief complaint of SROM and contractions. She was 4cm dilated on admission and desired a TOLAC. She had one prior  section due to failure to descend and one prior vaginal delivery. She was admitted and made no cervical change about about 2 hours. Her forebag was ruptured and clear fluid was noted. An IUPC was placed. She was augmented with Pitocin to 200 MVUs. She progressed along a normal labor curve. She received an epidural. She achieved complete cervical dilation and pushed for about 1 hours. She delivered vaginally under epidural anesthesia. The infant was suctioned with the bulb. There was no nuchal cord. The infant was placed on the patient's abdomen after delivery. The cord was clamped after a 30 second delay and subsequently cut by the father of the baby. The fundus was firm with massage and IV pitocin. The placenta delivered spontaneously, intact, with normal 3-vessel cord, in veliz presentation.     There were no cervical, vaginal, or perineal lacerations. There were 2 hymenal ring lacerations. The vulva was exteremly edematous (the patient reports this has happened with all of her deliveries). I re approximated the very superficial hymenal ring lacerations with 4-0 Vicryl on a SH. Viable female infant (baby girl Ada) weighs 3,530g (7lbs, 12.5oz.) Apgars were 8 and 9 at 1 and 5 minutes of life.    Mother and infant are recovering and doing well at this time. Sponge and  needle counts were correct x 1.       Liberty Cunningham M.D.

## 2022-01-31 NOTE — ANESTHESIA PREPROCEDURE EVALUATION
Date: 01/30/22  Procedure: Labor Epidural         Relevant Problems   No relevant active problems       Physical Exam    Airway   Mallampati: I  TM distance: >3 FB  Neck ROM: full       Cardiovascular - normal exam     Dental - normal exam           Pulmonary   Breath sounds clear to auscultation     Abdominal   (+) obese     Neurological - normal exam                 Anesthesia Plan    ASA 2       Plan - epidural   Neuraxial block will be labor analgesia                  Pertinent diagnostic labs and testing reviewed    Informed Consent:    Anesthetic plan and risks discussed with patient.

## 2022-01-31 NOTE — PROGRESS NOTES
"- BS report received from Arianne ESQUEDA, care assumed, VSS. Repositioned to left lateral side, EFM adjusted. POC and labor progression discussed with pt and FOB, both deny questions or needs at this time.   - Report c/o feeling pressure with each contraction. SVE revealed complete/+1 Contacted Dr. Cunningham, orders received to begin pushing and update on progress.    - Dr. Cunningham contacted, update given, strip reviewed, order received to rest and breath for next contraction while MD comes to BS.  - Dr. Cunningham at BS to evaluate pushing efforts with pt.  -  of viable infant female \"Ada\" 8/9 APGARS assigned. Infant skin to skin bonding with mother at this time.    - Up to , unable to void at this time. Transported up to PP unit with infant in arms, personal belongings, this RN and FOB at side.   BS report given to Azucena ESQUEDA, care assumed. Firm fundus noted. ID bands verified with 2 RN check. Pt and FOB deny any questions or concerns at this time.   "

## 2022-01-31 NOTE — ANESTHESIA PROCEDURE NOTES
Epidural Block    Date/Time: 1/30/2022 5:46 PM  Performed by: Morenita Ramsey M.D.  Authorized by: Morenita Ramsey M.D.     Patient Location:  OB  Start Time:  1/30/2022 5:46 PM  Reason for Block: labor analgesia    patient identified, IV checked, site marked, risks and benefits discussed, surgical consent, monitors and equipment checked, pre-op evaluation and timeout performed    Patient Position:  Sitting  Prep: ChloraPrep, patient draped and sterile technique    Monitoring:  Blood pressure, continuous pulse oximetry and heart rate  Approach:  Midline  Location:  L3-L4  Injection Technique:  PATRICIA saline  Skin infiltration:  Lidocaine  Strength:  1%  Dose:  3ml  Needle Type:  Tuohy  Needle Gauge:  17 G  Needle Length:  3.5 in  Loss of resistance::  4  Catheter Size:  19 G  Catheter at Skin Depth:  9  Test Dose Result:  Negative

## 2022-01-31 NOTE — ANESTHESIA POSTPROCEDURE EVALUATION
Patient: Demetra Lomeli    Procedure Summary     Date: 22 Room / Location: SURGERY LABOR AND DELIVERY    Anesthesia Start:  Anesthesia Stop:     Procedure:  SECTION, REPEAT (Abdomen) Diagnosis: (HISTORY OF , 40+3 WEEKS)    Surgeons: Andrea Ye M.D. Responsible Provider: Morenita Ramsey M.D.    Anesthesia Type: epidural ASA Status: 2          Final Anesthesia Type: epidural  Last vitals  BP   Blood Pressure: 103/58    Temp   37.2 °C (99 °F)    Pulse   90   Resp   18    SpO2   96 %      Anesthesia Post Evaluation    Patient location during evaluation: bedside  Patient participation: complete - patient participated  Level of consciousness: awake and alert  Pain score: 2    Airway patency: patent  Anesthetic complications: no  Cardiovascular status: adequate  Respiratory status: acceptable  Hydration status: acceptable    PONV: none          No complications documented.     Nurse Pain Score: 2 (NPRS)

## 2022-01-31 NOTE — ANESTHESIA TIME REPORT
Anesthesia Start and Stop Event Times     Date Time Event    2022 1703 Ready for Procedure      Anesthesia Start      Anesthesia Stop        Responsible Staff  22    Name Role Begin End    Morenita Ramsey M.D. Anesth 1739        Preop Diagnosis (Free Text):  Pre-op Diagnosis     HISTORY OF , 40+3 WEEKS        Preop Diagnosis (Codes):    Premium Reason  E. Weekend    Comments:

## 2022-01-31 NOTE — PROGRESS NOTES
Patient and infant brought to postpartum via wheelchair by L&D RN.  Bedside report received. Infant bands verified and cuddles alarm flashing. Assessment completed. Fundus firm, lochia light.  Patient and  oriented to unit, call light/emergency cord.  Discussed paperwork including depression screening, birth certificate, recording infant's intake and output. Patient breastfeeding infant independently.  Will continue to monitor.

## 2022-01-31 NOTE — PROGRESS NOTES
12 hour chart check done. MAR and notes reviewed.     @ 0700: Report received from Azucena ESQUEDA. Patient asleep. FOB is at the bedside. Assumed care.    @ 1000: Patient is awake and alert and well oriented. Discussed plan of care. Assessment done. Encouraged to use ice pack, tuck spray to perineum to reduce the swelling. Ambulating well. FOB is very supportive. Will check patient at frequent intervals.

## 2022-01-31 NOTE — CARE PLAN
The patient is Stable - Low risk of patient condition declining or worsening    Shift Goals  Clinical Goals: Firm fundus, lochia light  Patient Goals: Breastfeed  Family Goals: Rest    Progress made toward(s) clinical / shift goals:    Problem: Altered Physiologic Condition  Goal: Patient physiologically stable as evidenced by normal lochia, palpable uterine involution and vitals within normal limits  Outcome: Progressing  Note: Fundus firm, lochia light. Pt ambulating to bathroom independently.     Problem: Pain - Standard  Goal: Alleviation of pain or a reduction in pain to the patient’s comfort goal  Outcome: Progressing  Note: Administered motrin for mild cramping and soreness.      Problem: Knowledge Deficit - Postpartum  Goal: Patient will verbalize and demonstrate understanding of self and infant care  Outcome: Progressing  Note: Patient independently breastfeeding infant with latch score 8.

## 2022-01-31 NOTE — DISCHARGE SUMMARY
Obstetrics Discharge Summary    Admission Date: 2022         Discharge Date: 2022    ADMISSION DIAGNOSIS:  1.  Term intrauterine pregnancy at 40 weeks and 0 days.  2.  History of  section x1.  3.  Desires trial of labor after  section.  4.  Active labor.    DISCHARGE DIAGNOSIS:  1.  Term intrauterine pregnancy at 40 weeks and 0 days.  2.  History of  section x1.  3.  Desires trial of labor after  section.  4.  Active labor.    DETAILS OF HOSPITAL STAY  Presenting Problem/History of Present Illness: Contractions.    Hospital Course:  Patient is a 36 y.o. , who presented to [facility] at 40w0d with a chief complaint of contractions. She had prenatal care with [facility] with Dr. Ye. Pregnancy was complicated by one prior  section due to failure to descend. For full details of the delivery please refer to the delivery note dictation. Briefly, the patient underwent an uncomplicated normal spontaneous vaginal delivery. There were no complications. Estimated blood loss was 100ml. The patient delivered a viable female infant weighing 7lbs 12.5oz. with Apgars as below in delivery summary. Patient’s recovery and postpartum course were unremarkable. By postpartum day #1 patient met all appropriate milestones and was stable to be discharged to home.    APGARs:   8   9      COMPLICATIONS: none    PHYSICAL EXAM:  Vitals:   Vitals:    22 0500   BP: 100/58   Pulse: 79   Resp: 18   Temp: 36.7 °C (98 °F)   SpO2: 96%   General: Alert, conversational, pleasant, no acute distress  CVS: Regular rate  PULM: No respiratory distress, symmetric expansion  ABD: Soft, non-tender, non-distended, fundus firm, non-tender, below the umbilicus  : Deferred  Extremities: Moves all, trace edema     LABS/STUDIES:   Results for CELENA NEWSOME (MRN [id number]) as of 2022 05:52   Ref. Range 2022 13:36   WBC Latest Ref Range: 4.8 - 10.8 K/uL 9.5    RBC Latest Ref Range: 4.20 - 5.40 M/uL 4.20   Hemoglobin Latest Ref Range: 12.0 - 16.0 g/dL 13.2   Hematocrit Latest Ref Range: 37.0 - 47.0 % 38.1   MCV Latest Ref Range: 81.4 - 97.8 fL 90.7   MCH Latest Ref Range: 27.0 - 33.0 pg 31.4   MCHC Latest Ref Range: 33.6 - 35.0 g/dL 34.6   RDW Latest Ref Range: 35.9 - 50.0 fL 45.8   Platelet Count Latest Ref Range: 164 - 446 K/uL 208   MPV Latest Ref Range: 9.0 - 12.9 fL 11.6   a.m. CBC pending    DISPOSITION: Home.    DISCHARGE MEDICATIONS:  - Ibuprofen 800 mg every 8 hours as needed for pain.    DISCHARGE INSTRUCTIONS:  1. Pelvic rest for 6 weeks postpartum.   2. Postpartum visit in 6 weeks at [facility] [phone].  3. Return to the emergency department if experiencing increased vaginal bleeding, severe pain, temperature greater than 100.4, or any other concerns.    DISCHARGE CONDITION: Stable.    Liberty Cunningham M.D.

## 2022-01-31 NOTE — DISCHARGE INSTRUCTIONS
PATIENT DISCHARGE EDUCATION INSTRUCTION SHEET    REASONS TO CALL YOUR OBSTETRICIAN  · Persistent fever, shaking, chills (Temperature higher than 100.4) may indicate you have an infection  · Heavy bleeding: soaking more than 1 pad per hour; Passing clots an egg-sized clot or bigger may mean you have an postpartum hemorrhage  · Foul odor from vagina or bad smelling or discolored discharge or blood  · Breast infection (Mastitis symptoms); breast pain, chills, fever, redness or red streaks, may feel flu like symptoms  · Urinary pain, burning or frequency  · Incision that is not healing, increased redness, swelling, tenderness or pain, or any pus from episiotomy or  site may mean you have an infection  · Redness, swelling, warmth, or painful to touch in the calf area of your leg may mean you have a blood clot  · Severe or intensified depression, thoughts or feelings of wanting to hurt yourself or someone else   · Pain in chest, obstructed breathing or shortness of breath (trouble catching your breath) may mean you are having a postpartum complication. Call your provider immediately   · Headache that does not get better, even after taking medicine, a bad headache with vision changes or pain in the upper right area of your belly may mean you have high blood pressure or post birth preeclampsia. Call your provider immediately    HAND WASHING  All family and friends should wash their hands:  · Before and after holding the baby  · Before feeding the baby  · After using the restroom or changing the baby's diaper    WOUND CARE  Ask your physician for additional care instructions. In general:  · Episiotomy/Laceration  · May use ely-spray bottle, witch hazel pads and dermaplast spray for comfort  · Use ely-spray bottle after urinating to cleanse perineal area  · To prevent burning during urination spray ely-water bottle on labial area   · Pat perineal area dry until episiotomy/laceration is healed  · Continue to use  ely-bottle until bleeding stops as needed  · If have a 2nd degree laceration or greater, a Sitz bath can offer relief from soreness, burning, and inflammation   · Sitz Bath   · Sit in 6 inches of warm water and soak laceration as needed until the laceration heals    VAGINAL CARE AND BLEEDING  · Nothing inside vagina for 6 weeks:   · No sexual intercourse, tampons or douching  · Bleeding may continue for 2-4 weeks. Amount and color may vary  · Soaking 1 pad or more in an hour for several hours is considered heavy bleeding  · Passing large egg sized blood clots can be concerning  · If you feel like you have heavy bleeding or are having increasing amount of blood clots call your Obstetrician immediately  · If you begin feeling faint upon standing, feeling sick to your stomach, have clammy skin, a really fast heartbeat, have chills, start feeling confused, dizzy, sleepy or weak, or feeling like you're going to faint call your Obstetrician immediately    HYPERTENSION   Preeclampsia or gestational hypertension are types of high blood pressure that only pregnant women can get. It is important for you to be aware of symptoms to seek early intervention and treatment. If you have any of these symptoms immediately call your Obstetrician    · Vision changes or blurred vision   · Severe headache or pain that is unrelieved with medication and will not go away  · Persistent pain in upper abdomen or shoulder   · Increased swelling of face, feet, or hands  · Difficulty breathing or shortness of breath at rest  · Urinating less than usual    URINATION AND BOWEL MOVEMENTS  · Eating more fiber (bran cereal, fruits, and vegetables) and drinking plenty of fluids will help to avoid constipation  · Urinary frequency and urgency after childbirth is normal  · If you experience any urinary pain, burning or frequency call your provider    BIRTH CONTROL  · It is possible to become pregnant at any time after delivery and while  "breastfeeding  · Plan to discuss a method of birth control with your physician at your post delivery follow up visit    POSTPARTUM BLUES  During the first few days after birth, you may experience a sense of the \"blues\" which may include impatience, irritability or even crying. These feelings come and go quickly. However, as many as 1 in 10 women experience emotional symptoms known as postpartum depression.     POSTPARTUM DEPRESSION    May start as early as the second or third day after delivery or take several weeks or months to develop. Symptoms of \"blues\" are present, but are more intense: Crying spells; loss of appetite; feelings of hopelessness or loss of control; fear of touching the baby; over concern or no concern at all about the baby; little or no concern about your own appearance/caring for yourself; and/or inability to sleep or excessive sleeping. Contact your Obstetrician if you are experiencing any of these symptoms     PREVENTING SHAKEN BABY  If you are angry or stressed, PUT THE BABY IN THE CRIB, step away, take some deep breaths, and wait until you are calm to care for the baby. DO NOT SHAKE THE BABY. You are not alone, call a supporter for help.  · [facility] 24/7 crisis call line (902-848-5942) or (1-285.903.4209)  · You can also text them, text \"ANSWER\" (216371)      "

## 2022-02-01 NOTE — CARE PLAN
The patient is Stable - Low risk of patient condition declining or worsening    Shift Goals  Clinical Goals: Pain management  Patient Goals: To be able to go home tonight.    Progress made toward(s) clinical / shift goals:  Patient pain is well managed with Motrin. Perineal ice pack, tucks and spray help with the swelling and soreness too. She's ambulating well and voiding with no problem. Discharge instruction discussed. Medication for home is at the bedside. Emphasized the importance of  screening follow-up test. Questions and concerns have been answered.

## 2022-02-01 NOTE — PROGRESS NOTES
Educated patient about self care and  care, all questions answered. Pt. Bonding well with infant, educated about feeding times and amount. Pt. Discharged home, escorted to car. Will follow up with Dr. Cunningham.

## 2023-03-21 ENCOUNTER — OFFICE VISIT (OUTPATIENT)
Dept: URGENT CARE | Facility: CLINIC | Age: 38
End: 2023-03-21
Payer: COMMERCIAL

## 2023-03-21 VITALS
DIASTOLIC BLOOD PRESSURE: 76 MMHG | HEIGHT: 63 IN | HEART RATE: 59 BPM | SYSTOLIC BLOOD PRESSURE: 118 MMHG | BODY MASS INDEX: 27.46 KG/M2 | TEMPERATURE: 97 F | RESPIRATION RATE: 14 BRPM | OXYGEN SATURATION: 98 % | WEIGHT: 155 LBS

## 2023-03-21 DIAGNOSIS — J02.9 SORE THROAT: ICD-10-CM

## 2023-03-21 LAB — S PYO DNA SPEC NAA+PROBE: NOT DETECTED

## 2023-03-21 PROCEDURE — 87651 STREP A DNA AMP PROBE: CPT | Performed by: FAMILY MEDICINE

## 2023-03-21 PROCEDURE — 99203 OFFICE O/P NEW LOW 30 MIN: CPT | Performed by: FAMILY MEDICINE

## 2023-03-21 ASSESSMENT — FIBROSIS 4 INDEX: FIB4 SCORE: 1.17

## 2023-03-21 NOTE — PROGRESS NOTES
"  Subjective:      37 y.o. female presents to urgent care for sore throat that started yesterday. She denies any tobacco product use.  No history of asthma or COPD.  She is fully vaccinated against COVID.  Her daughter recently tested positive for strep.    She denies any other questions or concerns at this time.    Current problem list, medication, and past medical/surgical history were reviewed in Epic.    ROS  See HPI     Objective:      /76   Pulse (!) 59   Temp 36.1 °C (97 °F) (Temporal)   Resp 14   Ht 1.6 m (5' 3\")   Wt 70.3 kg (155 lb)   SpO2 98%   Breastfeeding Yes   BMI 27.46 kg/m²     Physical Exam  Constitutional:       General: She is not in acute distress.     Appearance: She is not diaphoretic.   HENT:      Right Ear: Tympanic membrane, ear canal and external ear normal.      Left Ear: Tympanic membrane, ear canal and external ear normal.      Mouth/Throat:      Tongue: Tongue does not deviate from midline.      Palate: No lesions.      Pharynx: Uvula midline. Posterior oropharyngeal erythema present.      Tonsils: No tonsillar exudate. 0 on the right. 0 on the left.   Cardiovascular:      Rate and Rhythm: Normal rate and regular rhythm.      Heart sounds: Normal heart sounds.   Pulmonary:      Effort: Pulmonary effort is normal. No respiratory distress.      Breath sounds: Normal breath sounds.   Neurological:      Mental Status: She is alert.   Psychiatric:         Mood and Affect: Affect normal.         Judgment: Judgment normal.     Assessment/Plan:     1. Sore throat  Rapid strep negative.  Most consistent with viral etiology.  Tylenol, ibuprofen, gargle warm salt water as needed for symptomatic relief.  - POCT GROUP A STREP, PCR      Instructed to return to Urgent Care or nearest Emergency Department if symptoms fail to improve, for any change in condition, further concerns, or new concerning symptoms. Patient states understanding of the plan of care and discharge " instructions.    Antonieta Polanco M.D.

## (undated) DEVICE — SODIUM CHL IRRIGATION 0.9% 1000ML (12EA/CA)

## (undated) DEVICE — HEAD HOLDER JUNIOR/ADULT

## (undated) DEVICE — TUBING CLEARLINK DUO-VENT - C-FLO (48EA/CA)

## (undated) DEVICE — CHLORAPREP 26 ML APPLICATOR - ORANGE TINT(25/CA)

## (undated) DEVICE — SWABSTICK BENZOIN SINGLE (50EA/BX)

## (undated) DEVICE — SET EXTENSION WITH 2 PORTS (48EA/CA) ***PART #2C8610 IS A SUBSTITUTE*****

## (undated) DEVICE — CATHETER IV NON-SAFETY 18 GA X 1 1/4 (50/BX 4BX/CA)

## (undated) DEVICE — ELECTRODE DUAL RETURN W/ CORD - (50/PK)

## (undated) DEVICE — SLEEVE, SEQUENTIAL CALF REG

## (undated) DEVICE — SUTURE 4-0 27IN VCRL PLUS ANTI (36PK/BX)

## (undated) DEVICE — SUTURE 0 CHROMIC CT-1 - (36/BX)

## (undated) DEVICE — WATER IRRIGATION STERILE 1000ML (12EA/CA)

## (undated) DEVICE — SUTURE 0 36 CHROMIC GUT CTX (36PK/BX)

## (undated) DEVICE — PACK C-SECTION (2EA/CA)

## (undated) DEVICE — TRAY SPINAL ANESTHESIA NON-SAFETY (10/CA)

## (undated) DEVICE — KIT  I.V. START (100EA/CA)

## (undated) DEVICE — SUTURE 0 VICRYL PLUS CT-1 - 36 INCH (36/BX)

## (undated) DEVICE — SUTURE 0 VICRYL PLUS CT 36 (36PK/BX)"

## (undated) DEVICE — TAPE CLOTH MEDIPORE 6 INCH - (12RL/CA)

## (undated) DEVICE — CANISTER SUCTION 3000ML MECHANICAL FILTER AUTO SHUTOFF MEDI-VAC NONSTERILE LF DISP  (40EA/CA)

## (undated) DEVICE — SUTURE 3-0 VICRYL PLUS CT-1 - 36 INCH (36/BX)

## (undated) DEVICE — SUTURE 2-0 VICRYL PLUS CT-1 36 (36PK/BX)"

## (undated) DEVICE — STAPLER SKIN DISP - (6/BX 10BX/CA) VISISTAT

## (undated) DEVICE — BLANKET UNDERBODY FULL ACCES - (5/CA)

## (undated) DEVICE — GLOVE BIOGEL INDICATOR SZ 7.5 SURGICAL PF LTX - (50PR/BX 4BX/CA)

## (undated) DEVICE — SUTURE3-0 36IN VCRLY PLS ANTI (36PK/BX)

## (undated) DEVICE — DETERGENT RENUZYME PLUS 10 OZ PACKET (50/BX)

## (undated) DEVICE — GLOVE BIOGEL SZ 7.5 SURGICAL PF LTX - (50PR/BX 4BX/CA)

## (undated) DEVICE — CLOSURE SKIN STRIP 1/2 X 4 IN - (STERI STRIP) (50/BX 4BX/CA)

## (undated) DEVICE — SUTURE 4-0 VICRYL PLUSFS-1 - 27 INCH (36/BX)